# Patient Record
Sex: FEMALE | Race: WHITE | NOT HISPANIC OR LATINO | Employment: OTHER | URBAN - METROPOLITAN AREA
[De-identification: names, ages, dates, MRNs, and addresses within clinical notes are randomized per-mention and may not be internally consistent; named-entity substitution may affect disease eponyms.]

---

## 2017-03-14 ENCOUNTER — HOSPITAL ENCOUNTER (OUTPATIENT)
Dept: LAB | Facility: MEDICAL CENTER | Age: 82
End: 2017-03-14
Attending: FAMILY MEDICINE
Payer: MEDICARE

## 2017-03-14 LAB
25(OH)D3 SERPL-MCNC: 28 NG/ML (ref 30–100)
ALBUMIN SERPL BCP-MCNC: 3.9 G/DL (ref 3.2–4.9)
ALBUMIN/GLOB SERPL: 1.3 G/DL
ALP SERPL-CCNC: 199 U/L (ref 30–99)
ALT SERPL-CCNC: 29 U/L (ref 2–50)
ANION GAP SERPL CALC-SCNC: 10 MMOL/L (ref 0–11.9)
APPEARANCE UR: CLEAR
AST SERPL-CCNC: 24 U/L (ref 12–45)
BACTERIA #/AREA URNS HPF: ABNORMAL /HPF
BASOPHILS # BLD AUTO: 0.08 K/UL (ref 0–0.12)
BASOPHILS NFR BLD AUTO: 1.3 % (ref 0–1.8)
BILIRUB SERPL-MCNC: 1.2 MG/DL (ref 0.1–1.5)
BILIRUB UR QL STRIP.AUTO: NEGATIVE
BUN SERPL-MCNC: 16 MG/DL (ref 8–22)
CALCIUM SERPL-MCNC: 9.8 MG/DL (ref 8.5–10.5)
CHLORIDE SERPL-SCNC: 100 MMOL/L (ref 96–112)
CHOLEST SERPL-MCNC: 250 MG/DL (ref 100–199)
CO2 SERPL-SCNC: 35 MMOL/L (ref 20–33)
COLOR UR AUTO: YELLOW
CREAT SERPL-MCNC: 0.74 MG/DL (ref 0.5–1.4)
CREAT UR-MCNC: 175.9 MG/DL
CULTURE IF INDICATED INDCX: NO UA CULTURE
EOSINOPHIL # BLD: 0.05 K/UL (ref 0–0.51)
EOSINOPHIL NFR BLD AUTO: 0.8 % (ref 0–6.9)
EPITHELIAL CELLS 1715: ABNORMAL /HPF
ERYTHROCYTE [DISTWIDTH] IN BLOOD BY AUTOMATED COUNT: 48.1 FL (ref 35.9–50)
EST. AVERAGE GLUCOSE BLD GHB EST-MCNC: 117 MG/DL
FOLATE SERPL-MCNC: >23.6 NG/ML
GLOBULIN SER CALC-MCNC: 3.1 G/DL (ref 1.9–3.5)
GLUCOSE SERPL-MCNC: 123 MG/DL (ref 65–99)
GLUCOSE UR STRIP.AUTO-MCNC: NEGATIVE MG/DL
HBA1C MFR BLD: 5.7 % (ref 0–5.6)
HCT VFR BLD AUTO: 40.8 % (ref 37–47)
HDLC SERPL-MCNC: 46 MG/DL
HGB BLD-MCNC: 12.7 G/DL (ref 12–16)
IMM GRANULOCYTES # BLD AUTO: 0.06 K/UL (ref 0–0.11)
IMM GRANULOCYTES NFR BLD AUTO: 1 % (ref 0–0.9)
KETONES UR STRIP.AUTO-MCNC: NEGATIVE MG/DL
LDLC SERPL CALC-MCNC: 172 MG/DL
LEUKOCYTE ESTERASE UR QL STRIP.AUTO: NEGATIVE
LYMPHOCYTES # BLD: 1.22 K/UL (ref 1–4.8)
LYMPHOCYTES NFR BLD AUTO: 20.3 % (ref 22–41)
MCH RBC QN AUTO: 30.4 PG (ref 27–33)
MCHC RBC AUTO-ENTMCNC: 31.1 G/DL (ref 33.6–35)
MCV RBC AUTO: 97.6 FL (ref 81.4–97.8)
MICRO URNS: ABNORMAL
MICROALBUMIN UR-MCNC: 154 MG/DL
MICROALBUMIN/CREAT UR: 875 MG/G (ref 0–30)
MONOCYTES # BLD: 0.44 K/UL (ref 0–0.85)
MONOCYTES NFR BLD AUTO: 7.3 % (ref 0–13.4)
MUCOUS THREADS URNS QL MICRO: ABNORMAL /HPF
NEUTROPHILS # BLD: 4.16 K/UL (ref 2–7.15)
NEUTROPHILS NFR BLD AUTO: 69.3 % (ref 44–72)
NITRITE UR QL STRIP.AUTO: NEGATIVE
NRBC # BLD AUTO: 0 K/UL
NRBC BLD-RTO: 0 /100 WBC
PH UR: 8 [PH]
PLATELET # BLD AUTO: 342 K/UL (ref 164–446)
PMV BLD AUTO: 11.7 FL (ref 9–12.9)
POTASSIUM SERPL-SCNC: 4.7 MMOL/L (ref 3.6–5.5)
PROT SERPL-MCNC: 7 G/DL (ref 6–8.2)
PROT UR QL STRIP: 600 MG/DL
RBC # BLD AUTO: 4.18 M/UL (ref 4.2–5.4)
RBC #/AREA URNS HPF: ABNORMAL /HPF
RBC UR QL AUTO: NEGATIVE
SODIUM SERPL-SCNC: 145 MMOL/L (ref 135–145)
SP GR UR STRIP.AUTO: 1.02
TRIGL SERPL-MCNC: 159 MG/DL (ref 0–149)
TSH SERPL DL<=0.005 MIU/L-ACNC: 4.87 UIU/ML (ref 0.3–3.7)
VIT B12 SERPL-MCNC: 80 PG/ML (ref 211–911)
WBC # BLD AUTO: 6 K/UL (ref 4.8–10.8)
WBC #/AREA URNS HPF: ABNORMAL /HPF

## 2017-03-14 PROCEDURE — 36415 COLL VENOUS BLD VENIPUNCTURE: CPT

## 2017-03-14 PROCEDURE — 82607 VITAMIN B-12: CPT

## 2017-03-14 PROCEDURE — 82746 ASSAY OF FOLIC ACID SERUM: CPT

## 2017-03-14 PROCEDURE — 82306 VITAMIN D 25 HYDROXY: CPT

## 2017-03-14 PROCEDURE — 81001 URINALYSIS AUTO W/SCOPE: CPT

## 2017-03-14 PROCEDURE — 83036 HEMOGLOBIN GLYCOSYLATED A1C: CPT

## 2017-03-14 PROCEDURE — 82043 UR ALBUMIN QUANTITATIVE: CPT

## 2017-03-14 PROCEDURE — 82570 ASSAY OF URINE CREATININE: CPT

## 2017-03-14 PROCEDURE — 85025 COMPLETE CBC W/AUTO DIFF WBC: CPT

## 2017-03-14 PROCEDURE — 80053 COMPREHEN METABOLIC PANEL: CPT

## 2017-03-14 PROCEDURE — 80061 LIPID PANEL: CPT

## 2017-03-14 PROCEDURE — 84443 ASSAY THYROID STIM HORMONE: CPT

## 2017-03-19 ENCOUNTER — APPOINTMENT (OUTPATIENT)
Dept: RADIOLOGY | Facility: MEDICAL CENTER | Age: 82
End: 2017-03-19
Attending: EMERGENCY MEDICINE
Payer: MEDICARE

## 2017-03-19 ENCOUNTER — HOSPITAL ENCOUNTER (EMERGENCY)
Facility: MEDICAL CENTER | Age: 82
End: 2017-03-19
Attending: EMERGENCY MEDICINE | Admitting: INTERNAL MEDICINE
Payer: MEDICARE

## 2017-03-19 ENCOUNTER — RESOLUTE PROFESSIONAL BILLING HOSPITAL PROF FEE (OUTPATIENT)
Dept: HOSPITALIST | Facility: MEDICAL CENTER | Age: 82
End: 2017-03-19
Payer: MEDICARE

## 2017-03-19 ENCOUNTER — HOSPITAL ENCOUNTER (INPATIENT)
Facility: MEDICAL CENTER | Age: 82
LOS: 5 days | DRG: 872 | End: 2017-03-24
Attending: INTERNAL MEDICINE | Admitting: INTERNAL MEDICINE
Payer: MEDICARE

## 2017-03-19 VITALS
TEMPERATURE: 98 F | SYSTOLIC BLOOD PRESSURE: 136 MMHG | DIASTOLIC BLOOD PRESSURE: 54 MMHG | RESPIRATION RATE: 18 BRPM | HEART RATE: 87 BPM | BODY MASS INDEX: 15.47 KG/M2 | HEIGHT: 59 IN | WEIGHT: 76.72 LBS | OXYGEN SATURATION: 99 %

## 2017-03-19 DIAGNOSIS — K81.0 ACUTE CHOLECYSTITIS: ICD-10-CM

## 2017-03-19 DIAGNOSIS — R41.3 MEMORY LOSS: ICD-10-CM

## 2017-03-19 DIAGNOSIS — A41.9 SEPSIS, DUE TO UNSPECIFIED ORGANISM: ICD-10-CM

## 2017-03-19 PROBLEM — N17.9 ARF (ACUTE RENAL FAILURE) (HCC): Status: ACTIVE | Noted: 2017-03-19

## 2017-03-19 PROBLEM — J96.11 CHRONIC RESPIRATORY FAILURE WITH HYPOXIA (HCC): Status: ACTIVE | Noted: 2017-03-19

## 2017-03-19 PROBLEM — E53.8 B12 DEFICIENCY: Status: ACTIVE | Noted: 2017-03-19

## 2017-03-19 LAB
ALBUMIN SERPL BCP-MCNC: 3.4 G/DL (ref 3.2–4.9)
ALBUMIN/GLOB SERPL: 1.2 G/DL
ALP SERPL-CCNC: 131 U/L (ref 30–99)
ALT SERPL-CCNC: 18 U/L (ref 2–50)
ANION GAP SERPL CALC-SCNC: 13 MMOL/L (ref 0–11.9)
APPEARANCE UR: CLEAR
APTT PPP: 26.8 SEC (ref 24.7–36)
AST SERPL-CCNC: 23 U/L (ref 12–45)
BACTERIA #/AREA URNS HPF: ABNORMAL /HPF
BASOPHILS # BLD AUTO: 0.2 % (ref 0–1.8)
BASOPHILS # BLD: 0.04 K/UL (ref 0–0.12)
BILIRUB SERPL-MCNC: 1.5 MG/DL (ref 0.1–1.5)
BILIRUB UR QL CFM: NEGATIVE
BUN SERPL-MCNC: 29 MG/DL (ref 8–22)
CALCIUM SERPL-MCNC: 9.1 MG/DL (ref 8.4–10.2)
CASTS URNS QL MICRO: ABNORMAL /LPF
CHLORIDE SERPL-SCNC: 96 MMOL/L (ref 96–112)
CO2 SERPL-SCNC: 29 MMOL/L (ref 20–33)
COLOR UR: YELLOW
CREAT SERPL-MCNC: 1.2 MG/DL (ref 0.5–1.4)
CULTURE IF INDICATED INDCX: NO UA CULTURE
EOSINOPHIL # BLD AUTO: 0 K/UL (ref 0–0.51)
EOSINOPHIL NFR BLD: 0 % (ref 0–6.9)
EPI CELLS #/AREA URNS HPF: ABNORMAL /HPF
ERYTHROCYTE [DISTWIDTH] IN BLOOD BY AUTOMATED COUNT: 46.9 FL (ref 35.9–50)
GFR SERPL CREATININE-BSD FRML MDRD: 43 ML/MIN/1.73 M 2
GLOBULIN SER CALC-MCNC: 2.9 G/DL (ref 1.9–3.5)
GLUCOSE SERPL-MCNC: 146 MG/DL (ref 65–99)
GLUCOSE UR STRIP.AUTO-MCNC: NEGATIVE MG/DL
HCT VFR BLD AUTO: 42.2 % (ref 37–47)
HGB BLD-MCNC: 13.7 G/DL (ref 12–16)
IMM GRANULOCYTES # BLD AUTO: 0.14 K/UL (ref 0–0.11)
IMM GRANULOCYTES NFR BLD AUTO: 0.6 % (ref 0–0.9)
INR PPP: 1.46 (ref 0.87–1.13)
KETONES UR STRIP.AUTO-MCNC: ABNORMAL MG/DL
LACTATE BLD-SCNC: 2.49 MMOL/L (ref 0.5–2)
LACTATE BLD-SCNC: 3 MMOL/L (ref 0.5–2)
LACTATE BLD-SCNC: 3.38 MMOL/L (ref 0.5–2)
LACTATE BLD-SCNC: 3.56 MMOL/L (ref 0.5–2)
LEUKOCYTE ESTERASE UR QL STRIP.AUTO: NEGATIVE
LIPASE SERPL-CCNC: 15 U/L (ref 7–58)
LYMPHOCYTES # BLD AUTO: 0.62 K/UL (ref 1–4.8)
LYMPHOCYTES NFR BLD: 2.4 % (ref 22–41)
MCH RBC QN AUTO: 31.1 PG (ref 27–33)
MCHC RBC AUTO-ENTMCNC: 32.5 G/DL (ref 33.6–35)
MCV RBC AUTO: 95.7 FL (ref 81.4–97.8)
MICRO URNS: ABNORMAL
MONOCYTES # BLD AUTO: 2.09 K/UL (ref 0–0.85)
MONOCYTES NFR BLD AUTO: 8.2 % (ref 0–13.4)
MUCOUS THREADS #/AREA URNS HPF: ABNORMAL /HPF
NEUTROPHILS # BLD AUTO: 22.47 K/UL (ref 2–7.15)
NEUTROPHILS NFR BLD: 88.6 % (ref 44–72)
NITRITE UR QL STRIP.AUTO: NEGATIVE
NRBC # BLD AUTO: 0 K/UL
NRBC BLD AUTO-RTO: 0 /100 WBC
PH UR STRIP.AUTO: 5.5 [PH]
PLATELET # BLD AUTO: 306 K/UL (ref 164–446)
PMV BLD AUTO: 11.2 FL (ref 9–12.9)
POTASSIUM SERPL-SCNC: 4.3 MMOL/L (ref 3.6–5.5)
PROT SERPL-MCNC: 6.3 G/DL (ref 6–8.2)
PROT UR QL STRIP: 100 MG/DL
PROTHROMBIN TIME: 17.5 SEC (ref 12–14.6)
RBC # BLD AUTO: 4.41 M/UL (ref 4.2–5.4)
RBC # URNS HPF: ABNORMAL /HPF
RBC UR QL AUTO: NEGATIVE
SODIUM SERPL-SCNC: 138 MMOL/L (ref 135–145)
SP GR UR REFRACTOMETRY: 1.02
SPECIMEN DRAWN FROM PATIENT: ABNORMAL
TROPONIN I SERPL-MCNC: <0.02 NG/ML (ref 0–0.04)
UNIDENT CRYS URNS QL MICRO: ABNORMAL /HPF
WBC # BLD AUTO: 25.4 K/UL (ref 4.8–10.8)
WBC #/AREA URNS HPF: ABNORMAL /HPF

## 2017-03-19 PROCEDURE — 94760 N-INVAS EAR/PLS OXIMETRY 1: CPT

## 2017-03-19 PROCEDURE — 87040 BLOOD CULTURE FOR BACTERIA: CPT

## 2017-03-19 PROCEDURE — 99285 EMERGENCY DEPT VISIT HI MDM: CPT

## 2017-03-19 PROCEDURE — 82962 GLUCOSE BLOOD TEST: CPT

## 2017-03-19 PROCEDURE — 81001 URINALYSIS AUTO W/SCOPE: CPT

## 2017-03-19 PROCEDURE — 700105 HCHG RX REV CODE 258: Performed by: EMERGENCY MEDICINE

## 2017-03-19 PROCEDURE — 85610 PROTHROMBIN TIME: CPT

## 2017-03-19 PROCEDURE — 83036 HEMOGLOBIN GLYCOSYLATED A1C: CPT

## 2017-03-19 PROCEDURE — 96361 HYDRATE IV INFUSION ADD-ON: CPT

## 2017-03-19 PROCEDURE — 96367 TX/PROPH/DG ADDL SEQ IV INF: CPT

## 2017-03-19 PROCEDURE — 99223 1ST HOSP IP/OBS HIGH 75: CPT | Performed by: INTERNAL MEDICINE

## 2017-03-19 PROCEDURE — 74177 CT ABD & PELVIS W/CONTRAST: CPT

## 2017-03-19 PROCEDURE — 51701 INSERT BLADDER CATHETER: CPT

## 2017-03-19 PROCEDURE — 36415 COLL VENOUS BLD VENIPUNCTURE: CPT

## 2017-03-19 PROCEDURE — 85025 COMPLETE CBC W/AUTO DIFF WBC: CPT

## 2017-03-19 PROCEDURE — 70450 CT HEAD/BRAIN W/O DYE: CPT

## 2017-03-19 PROCEDURE — 700105 HCHG RX REV CODE 258: Performed by: INTERNAL MEDICINE

## 2017-03-19 PROCEDURE — 71010 DX-CHEST-PORTABLE (1 VIEW): CPT

## 2017-03-19 PROCEDURE — 80053 COMPREHEN METABOLIC PANEL: CPT

## 2017-03-19 PROCEDURE — 700117 HCHG RX CONTRAST REV CODE 255: Performed by: EMERGENCY MEDICINE

## 2017-03-19 PROCEDURE — 700111 HCHG RX REV CODE 636 W/ 250 OVERRIDE (IP): Performed by: EMERGENCY MEDICINE

## 2017-03-19 PROCEDURE — 93005 ELECTROCARDIOGRAM TRACING: CPT | Performed by: EMERGENCY MEDICINE

## 2017-03-19 PROCEDURE — 96365 THER/PROPH/DIAG IV INF INIT: CPT

## 2017-03-19 PROCEDURE — 770020 HCHG ROOM/CARE - TELE (206)

## 2017-03-19 PROCEDURE — 700111 HCHG RX REV CODE 636 W/ 250 OVERRIDE (IP)

## 2017-03-19 PROCEDURE — 700101 HCHG RX REV CODE 250: Performed by: INTERNAL MEDICINE

## 2017-03-19 PROCEDURE — 83605 ASSAY OF LACTIC ACID: CPT | Mod: 91

## 2017-03-19 PROCEDURE — 94640 AIRWAY INHALATION TREATMENT: CPT

## 2017-03-19 PROCEDURE — 84484 ASSAY OF TROPONIN QUANT: CPT

## 2017-03-19 PROCEDURE — 85730 THROMBOPLASTIN TIME PARTIAL: CPT

## 2017-03-19 PROCEDURE — 700111 HCHG RX REV CODE 636 W/ 250 OVERRIDE (IP): Performed by: INTERNAL MEDICINE

## 2017-03-19 PROCEDURE — 304561 HCHG STAT O2

## 2017-03-19 PROCEDURE — 700102 HCHG RX REV CODE 250 W/ 637 OVERRIDE(OP): Performed by: INTERNAL MEDICINE

## 2017-03-19 PROCEDURE — 83690 ASSAY OF LIPASE: CPT

## 2017-03-19 RX ORDER — SODIUM CHLORIDE 9 MG/ML
30 INJECTION, SOLUTION INTRAVENOUS
Status: DISCONTINUED | OUTPATIENT
Start: 2017-03-19 | End: 2017-03-19 | Stop reason: HOSPADM

## 2017-03-19 RX ORDER — SODIUM CHLORIDE 9 MG/ML
INJECTION, SOLUTION INTRAVENOUS CONTINUOUS
Status: CANCELLED | OUTPATIENT
Start: 2017-03-19

## 2017-03-19 RX ORDER — IPRATROPIUM BROMIDE AND ALBUTEROL SULFATE 2.5; .5 MG/3ML; MG/3ML
3 SOLUTION RESPIRATORY (INHALATION)
Status: DISCONTINUED | OUTPATIENT
Start: 2017-03-20 | End: 2017-03-19 | Stop reason: HOSPADM

## 2017-03-19 RX ORDER — DEXTROSE MONOHYDRATE 25 G/50ML
25 INJECTION, SOLUTION INTRAVENOUS
Status: DISCONTINUED | OUTPATIENT
Start: 2017-03-19 | End: 2017-03-19 | Stop reason: HOSPADM

## 2017-03-19 RX ORDER — LORAZEPAM 1 MG/1
0.5 TABLET ORAL EVERY 6 HOURS PRN
Status: DISCONTINUED | OUTPATIENT
Start: 2017-03-19 | End: 2017-03-23

## 2017-03-19 RX ORDER — ONDANSETRON 4 MG/1
4 TABLET, ORALLY DISINTEGRATING ORAL EVERY 4 HOURS PRN
Status: DISCONTINUED | OUTPATIENT
Start: 2017-03-19 | End: 2017-03-24 | Stop reason: HOSPADM

## 2017-03-19 RX ORDER — ONDANSETRON 2 MG/ML
4 INJECTION INTRAMUSCULAR; INTRAVENOUS EVERY 4 HOURS PRN
Status: CANCELLED | OUTPATIENT
Start: 2017-03-19

## 2017-03-19 RX ORDER — FAMOTIDINE 20 MG/1
20 TABLET, FILM COATED ORAL 2 TIMES DAILY
Status: DISCONTINUED | OUTPATIENT
Start: 2017-03-20 | End: 2017-03-22

## 2017-03-19 RX ORDER — SODIUM CHLORIDE 9 MG/ML
INJECTION, SOLUTION INTRAVENOUS CONTINUOUS
Status: DISCONTINUED | OUTPATIENT
Start: 2017-03-19 | End: 2017-03-23

## 2017-03-19 RX ORDER — LORAZEPAM 0.5 MG/1
0.5 TABLET ORAL EVERY 6 HOURS PRN
Status: CANCELLED | OUTPATIENT
Start: 2017-03-19

## 2017-03-19 RX ORDER — MORPHINE SULFATE 4 MG/ML
2 INJECTION, SOLUTION INTRAMUSCULAR; INTRAVENOUS
Status: DISCONTINUED | OUTPATIENT
Start: 2017-03-19 | End: 2017-03-23

## 2017-03-19 RX ORDER — IPRATROPIUM BROMIDE AND ALBUTEROL SULFATE 2.5; .5 MG/3ML; MG/3ML
3 SOLUTION RESPIRATORY (INHALATION)
Status: COMPLETED | OUTPATIENT
Start: 2017-03-19 | End: 2017-03-19

## 2017-03-19 RX ORDER — MORPHINE SULFATE 4 MG/ML
2 INJECTION, SOLUTION INTRAMUSCULAR; INTRAVENOUS
Status: CANCELLED | OUTPATIENT
Start: 2017-03-19

## 2017-03-19 RX ORDER — CYANOCOBALAMIN 1000 UG/ML
1000 INJECTION, SOLUTION INTRAMUSCULAR; SUBCUTANEOUS
Status: DISCONTINUED | OUTPATIENT
Start: 2017-03-19 | End: 2017-03-19 | Stop reason: HOSPADM

## 2017-03-19 RX ORDER — FAMOTIDINE 20 MG/1
20 TABLET, FILM COATED ORAL 2 TIMES DAILY
Status: DISCONTINUED | OUTPATIENT
Start: 2017-03-19 | End: 2017-03-19 | Stop reason: HOSPADM

## 2017-03-19 RX ORDER — ONDANSETRON 2 MG/ML
4 INJECTION INTRAMUSCULAR; INTRAVENOUS EVERY 4 HOURS PRN
Status: DISCONTINUED | OUTPATIENT
Start: 2017-03-19 | End: 2017-03-19 | Stop reason: HOSPADM

## 2017-03-19 RX ORDER — SODIUM CHLORIDE 9 MG/ML
INJECTION, SOLUTION INTRAVENOUS CONTINUOUS
Status: DISCONTINUED | OUTPATIENT
Start: 2017-03-19 | End: 2017-03-19 | Stop reason: HOSPADM

## 2017-03-19 RX ORDER — FAMOTIDINE 20 MG/1
20 TABLET, FILM COATED ORAL 2 TIMES DAILY
Status: CANCELLED | OUTPATIENT
Start: 2017-03-19

## 2017-03-19 RX ORDER — CYANOCOBALAMIN 1000 UG/ML
1000 INJECTION, SOLUTION INTRAMUSCULAR; SUBCUTANEOUS
Status: DISCONTINUED | OUTPATIENT
Start: 2017-04-18 | End: 2017-03-24 | Stop reason: HOSPADM

## 2017-03-19 RX ORDER — SODIUM CHLORIDE 9 MG/ML
500 INJECTION, SOLUTION INTRAVENOUS
Status: DISCONTINUED | OUTPATIENT
Start: 2017-03-19 | End: 2017-03-19 | Stop reason: HOSPADM

## 2017-03-19 RX ORDER — MORPHINE SULFATE 4 MG/ML
2 INJECTION, SOLUTION INTRAMUSCULAR; INTRAVENOUS
Status: DISCONTINUED | OUTPATIENT
Start: 2017-03-19 | End: 2017-03-19 | Stop reason: HOSPADM

## 2017-03-19 RX ORDER — SODIUM CHLORIDE 9 MG/ML
30 INJECTION, SOLUTION INTRAVENOUS ONCE
Status: DISCONTINUED | OUTPATIENT
Start: 2017-03-19 | End: 2017-03-19

## 2017-03-19 RX ORDER — SODIUM CHLORIDE 9 MG/ML
500 INJECTION, SOLUTION INTRAVENOUS
Status: CANCELLED | OUTPATIENT
Start: 2017-03-19

## 2017-03-19 RX ORDER — LORAZEPAM 2 MG/ML
0.5 INJECTION INTRAMUSCULAR EVERY 6 HOURS PRN
Status: DISCONTINUED | OUTPATIENT
Start: 2017-03-19 | End: 2017-03-19 | Stop reason: HOSPADM

## 2017-03-19 RX ORDER — ONDANSETRON 2 MG/ML
4 INJECTION INTRAMUSCULAR; INTRAVENOUS EVERY 4 HOURS PRN
Status: DISCONTINUED | OUTPATIENT
Start: 2017-03-19 | End: 2017-03-24 | Stop reason: HOSPADM

## 2017-03-19 RX ORDER — CYANOCOBALAMIN 1000 UG/ML
1000 INJECTION, SOLUTION INTRAMUSCULAR; SUBCUTANEOUS
Status: CANCELLED | OUTPATIENT
Start: 2017-04-18

## 2017-03-19 RX ORDER — LORAZEPAM 2 MG/ML
0.5 INJECTION INTRAMUSCULAR EVERY 6 HOURS PRN
Status: CANCELLED | OUTPATIENT
Start: 2017-03-19

## 2017-03-19 RX ORDER — SODIUM CHLORIDE 9 MG/ML
1000 INJECTION, SOLUTION INTRAVENOUS ONCE
Status: COMPLETED | OUTPATIENT
Start: 2017-03-19 | End: 2017-03-19

## 2017-03-19 RX ORDER — DEXTROSE MONOHYDRATE 25 G/50ML
25 INJECTION, SOLUTION INTRAVENOUS
Status: DISCONTINUED | OUTPATIENT
Start: 2017-03-19 | End: 2017-03-24 | Stop reason: HOSPADM

## 2017-03-19 RX ORDER — LORAZEPAM 0.5 MG/1
0.5 TABLET ORAL EVERY 6 HOURS PRN
Status: DISCONTINUED | OUTPATIENT
Start: 2017-03-19 | End: 2017-03-19 | Stop reason: HOSPADM

## 2017-03-19 RX ORDER — LORAZEPAM 2 MG/ML
0.5 INJECTION INTRAMUSCULAR EVERY 6 HOURS PRN
Status: DISCONTINUED | OUTPATIENT
Start: 2017-03-19 | End: 2017-03-23

## 2017-03-19 RX ORDER — SODIUM CHLORIDE 9 MG/ML
500 INJECTION, SOLUTION INTRAVENOUS
Status: COMPLETED | OUTPATIENT
Start: 2017-03-19 | End: 2017-03-19

## 2017-03-19 RX ORDER — DEXTROSE MONOHYDRATE 25 G/50ML
25 INJECTION, SOLUTION INTRAVENOUS
Status: CANCELLED | OUTPATIENT
Start: 2017-03-19

## 2017-03-19 RX ORDER — ONDANSETRON 4 MG/1
4 TABLET, ORALLY DISINTEGRATING ORAL EVERY 4 HOURS PRN
Status: DISCONTINUED | OUTPATIENT
Start: 2017-03-19 | End: 2017-03-19 | Stop reason: HOSPADM

## 2017-03-19 RX ORDER — IPRATROPIUM BROMIDE AND ALBUTEROL SULFATE 2.5; .5 MG/3ML; MG/3ML
3 SOLUTION RESPIRATORY (INHALATION)
Status: DISCONTINUED | OUTPATIENT
Start: 2017-03-19 | End: 2017-03-19 | Stop reason: HOSPADM

## 2017-03-19 RX ORDER — ONDANSETRON 4 MG/1
4 TABLET, ORALLY DISINTEGRATING ORAL EVERY 4 HOURS PRN
Status: CANCELLED | OUTPATIENT
Start: 2017-03-19

## 2017-03-19 RX ORDER — SODIUM CHLORIDE 9 MG/ML
30 INJECTION, SOLUTION INTRAVENOUS ONCE
Status: COMPLETED | OUTPATIENT
Start: 2017-03-19 | End: 2017-03-19

## 2017-03-19 RX ADMIN — SODIUM CHLORIDE 185 ML: 9 INJECTION, SOLUTION INTRAVENOUS at 16:36

## 2017-03-19 RX ADMIN — CEFEPIME HYDROCHLORIDE 2 G: 2 INJECTION, POWDER, FOR SOLUTION INTRAVENOUS at 16:15

## 2017-03-19 RX ADMIN — IPRATROPIUM BROMIDE AND ALBUTEROL SULFATE 3 ML: .5; 3 SOLUTION RESPIRATORY (INHALATION) at 18:57

## 2017-03-19 RX ADMIN — IOHEXOL 75 ML: 350 INJECTION, SOLUTION INTRAVENOUS at 16:00

## 2017-03-19 RX ADMIN — METRONIDAZOLE 500 MG: 500 INJECTION, SOLUTION INTRAVENOUS at 18:41

## 2017-03-19 RX ADMIN — SODIUM CHLORIDE: 9 INJECTION, SOLUTION INTRAVENOUS at 18:32

## 2017-03-19 RX ADMIN — SODIUM CHLORIDE: 9 INJECTION, SOLUTION INTRAVENOUS at 22:15

## 2017-03-19 RX ADMIN — CYANOCOBALAMIN 1000 MCG: 1000 INJECTION, SOLUTION INTRAMUSCULAR at 18:41

## 2017-03-19 RX ADMIN — SODIUM CHLORIDE 1000 ML: 9 INJECTION, SOLUTION INTRAVENOUS at 14:48

## 2017-03-19 RX ADMIN — PIPERACILLIN AND TAZOBACTAM 4.5 G: 4; .5 INJECTION, POWDER, LYOPHILIZED, FOR SOLUTION INTRAVENOUS; PARENTERAL at 16:35

## 2017-03-19 ASSESSMENT — COPD QUESTIONNAIRES
HAVE YOU SMOKED AT LEAST 100 CIGARETTES IN YOUR ENTIRE LIFE: NO/DON'T KNOW
DO YOU EVER COUGH UP ANY MUCUS OR PHLEGM?: NO/ONLY WITH OCCASIONAL COLDS OR INFECTIONS
DURING THE PAST 4 WEEKS HOW MUCH DID YOU FEEL SHORT OF BREATH: NONE/LITTLE OF THE TIME
COPD SCREENING SCORE: 2

## 2017-03-19 ASSESSMENT — LIFESTYLE VARIABLES
EVER_SMOKED: YES
EVER_SMOKED: NEVER
DO YOU DRINK ALCOHOL: NO
EVER_SMOKED: YES
ALCOHOL_USE: NO
EVER_SMOKED: NEVER

## 2017-03-19 ASSESSMENT — PAIN SCALES - GENERAL
PAINLEVEL_OUTOF10: 2
PAINLEVEL_OUTOF10: 0
PAINLEVEL_OUTOF10: 8

## 2017-03-19 NOTE — ED NOTES
BIB EMS from home (where pt lives alone) after son (who is POA) checked in on her and found her awake but slumped in a chair.  Son states increasing weakness recently/decreased motivation to get up and move.  Son states not eating at home.  Son states often doesn't wear her home O2 (hx COPD).   Son states intermittent confusion/unconfirmed dementia x years.  Pt currently oriented to name and city.   strong equal, face symmetric, speech clear.

## 2017-03-19 NOTE — ED NOTES
Blood drawn and to lab.  Mini-cathed for urine (tolerated well) and urine to lab.  NS infusing.  Family ay BS and call light in reach.  Awaits imaging.

## 2017-03-19 NOTE — IP AVS SNAPSHOT
" <p align=\"LEFT\"><IMG SRC=\"//[email or web address]$/[email or web address]\" alt=\"Image\" WIDTH=\"50%\" HEIGHT=\"200\" BORDER=\"\"></p>                   Name:Nicole Vora  Medical Record Number:[id number]  CSN: [id number]    YOB: 1934   Age: 82 y.o.  Sex: female  HT:1.499 m (4' 11\") WT: 48.3 kg (106 lb 7.7 oz)          Admit Date: 3/19/2017     Discharge Date:   Today's Date: 3/24/2017  Attending Doctor:  Obed Schaffer M.D.                  Allergies:  Review of patient's allergies indicates no known allergies.          Follow-up Information     1. Schedule an appointment as soon as possible for a visit with Reena Luong M.D..    Specialty:  Family Medicine    Why:  Follow-up appointment    Contact information    [address]  [place] [address]  [phone]          2. Follow up with Lucille Polanco M.D. In 2 weeks.    Specialties:  Surgery, Radiology    Contact information    [address]  [address]  [place] [id number] [phone]          3. Follow up with Reena Luong M.D. In 1 week.    Specialty:  Family Medicine    Contact information    [address] [facility] [address]  [address]  [place] [address]  [phone]           Medication List      Take these Medications        Instructions    ciprofloxacin 500 MG Tabs   Commonly known as:  CIPRO    Take 1 Tab by mouth every 12 hours for 7 days.   Dose:  500 mg       cyanocobalamin 1000 MCG Tabs   Commonly known as:  VITAMIN B12    Take 1 Tab by mouth every day.   Dose:  1000 mcg       furosemide 20 MG Tabs   Commonly known as:  LASIX    Take 1 Tab by mouth 2 times a day for 7 days.   Dose:  20 mg       metronidazole 500 MG Tabs   Commonly known as:  FLAGYL    Take 1 Tab by mouth every 8 hours for 7 days.   Dose:  500 mg       ondansetron 4 MG Tbdp   Commonly known as:  ZOFRAN ODT    Take 1 Tab by mouth every four hours as needed for Nausea/Vomiting (give PO if IV route is unavailable. May give per feeding tube.).   Dose:  4 mg       potassium " chloride SA 10 MEQ Tbcr   Commonly known as:  K-DUR    Take 2 Tabs by mouth every day.   Dose:  20 mEq

## 2017-03-19 NOTE — ED NOTES
2nd set bld cx drawn and additional iv placed . Pt to ct at this time. Update given to pt and family

## 2017-03-19 NOTE — ED PROVIDER NOTES
ED Provider Note    CHIEF COMPLAINT  Chief Complaint   Patient presents with   • Weakness       HPI  Nicole Vora is a 82 y.o. female who presents to the ER complaining of weakness.  The patient will be confused.  The pars Juan or cannot obtain much history from her.  She denies any complaints of pain or injury or other complaints.    The family states that she was last seen 2 days ago, was acting normally spoke with her the phone yesterday and she seemed fine today.  She was found slumped over in the chair.  She is weak, unable to stand or get up.  She is brought in here.  She denies any other acute concerns or complaints.  Family states she has not been eating well over the last 2 days.  No other history is really obtainable because of her mental status.    REVIEW OF SYSTEMS  See HPI for further details.  Unable to obtain completely review of systems secondary to mental status.    PAST MEDICAL HISTORY  Past Medical History   Diagnosis Date   • COPD    • Breast cancer    • Hyperlipidemia    • Depression        FAMILY HISTORY  Family History   Problem Relation Age of Onset   • Heart Disease Neg Hx        SOCIAL HISTORY  Social History     Social History   • Marital Status: Single     Spouse Name: N/A   • Number of Children: N/A   • Years of Education: N/A     Social History Main Topics   • Smoking status: Former Smoker     Types: Cigarettes     Quit date: 01/01/1984   • Smokeless tobacco: Never Used   • Alcohol Use: Not on file   • Drug Use: Not on file   • Sexual Activity: Not on file     Other Topics Concern   • Not on file     Social History Narrative   • No narrative on file       SURGICAL HISTORY  Past Surgical History   Procedure Laterality Date   • Lumpectomy         CURRENT MEDICATIONS  Home Medications     **Home medications have not yet been reviewed for this encounter**      .  She is on no medications    ALLERGIES  No Known Allergies    PHYSICAL EXAM  VITAL SIGNS: /59 mmHg  Pulse 90   "Temp([facility]) 37.3 °C (99.1 °F)  Resp 18  Ht 1.499 m (4' 11.02\")  Wt 39.463 kg (87 lb)  BMI 17.56 kg/m2  SpO2 97%     Constitutional: Awake, alert, ill-appearing but in no acute cardiopulmonary distress  HENT: Normocephalic, Atraumatic, Bilateral external ears normal, Oropharynx dry, No oral exudates, Nose normal.   Eyes: PERRL, EOMI, Conjunctiva normal, No discharge.   Neck: Normal range of motion, No tenderness, Supple, No stridor.   Lymphatic: No lymphadenopathy noted.   Cardiovascular: Normal heart rate, Normal rhythm, No murmurs, No rubs, No gallops.   Thorax & Lungs: Distant breath sounds.  No crackles  Abdomen: Bowel sounds normal, Soft, tender in the right.  Umbilical and right upper quadrant with a palpable mass.  No peritonitis  Skin: Warm, Dry, No erythema, No rash.   Back: No tenderness, No CVA tenderness.   Musculoskeletal: Good range of motion in all major joints.   Neurologic: Alert & oriented x 3, No focal deficits noted.   Psychiatric: Affect normal,      RADIOLOGY/PROCEDURES  CT-HEAD W/O   Final Result      1.  Cerebral atrophy.      2.  White matter lucencies most consistent with small vessel ischemic change versus demyelination or gliosis.      3.  Otherwise, Head CT without contrast with no acute findings. No evidence of acute cerebral infarction, hemorrhage or mass lesion.      DX-CHEST-PORTABLE (1 VIEW)   Final Result      Enlarged cardiac silhouette without evidence of acute disease.      CT-ABDOMEN-PELVIS WITH   Final Result      1.  Markedly distended gallbladder with gallbladder wall thickening and pericholecystic fluid/inflammation with apparent gallstone in the gallbladder neck consistent with acute cholecystitis with intra and extra hepatic biliary duct dilatation which    appears due to choledocholithiasis.      2.  Diverticulosis without evidence of diverticulitis.      3.  Aortic atherosclerosis.      4.  Bilateral renal cysts.          EKG Interpretation    Interpreted by " me    Rhythm: normal sinus   Rate: normal  Axis: normal  Ectopy: none  Conduction: normal  ST Segments: no acute change  T Waves: no acute change  Q Waves: none    Clinical Impression: no acute changes and normal EKG    COURSE & MEDICAL DECISION MAKING  Pertinent Labs & Imaging studies reviewed. (See chart for details)  Patient presents with confusion, weakness, and overall concerns for dehydration and failure to thrive.  Broad differential diagnosis was considered including but not limited to UTI, pneumonia, sepsis, dehydration, diverticulitis, cholecystitis.  Workup was initiated for this.    Labs and fluids are ordered.    CT was ordered of the abdomen because of a palpable mass.  The right upper quadrant.  Head CT was ordered because of confusion and weakness.    Head CT and chest x-ray were negative.  Labs came back quite abnormal, so she started her current antibiotics.  Cultures and lactic acid are obtained.  She is given a fluid bolus for sepsis.  She is given antibiotics.    CT came back with distended gallbladder.  I switched her to Zosyn.  I spoke with the surgeon.  The hospitalist has seen and admitted the patient.  Patient is admitted for cholecystitis, biliary obstruction.*    FINAL IMPRESSION  1. Acute cholecystitis    2. Sepsis, due to unspecified organism (CMS-[facility])             Electronically signed by: Raphael Montilla, 3/19/2017 3:00 PM

## 2017-03-19 NOTE — IP AVS SNAPSHOT
" Home Care Instructions                                                                                                                  Name:Nicole Vora  Medical Record Number:[id number]  CSN: [id number]    YOB: 1934   Age: 82 y.o.  Sex: female  HT:1.499 m (4' 11\") WT: 48.3 kg (106 lb 7.7 oz)          Admit Date: 3/19/2017     Discharge Date:   Today's Date: 3/24/2017  Attending Doctor:  Obed Schaffer M.D.                  Allergies:  Review of patient's allergies indicates no known allergies.            Discharge Instructions       Discharge Instructions    Discharged to other by medical transportation with escort. Discharged via wheelchair, hospital escort: Yes.  Special equipment needed: Oxygen    Be sure to schedule a follow-up appointment with your primary care doctor or any specialists as instructed.     Discharge Plan:   Diet Plan: Discussed  Activity Level: Discussed  Confirmed Follow up Appointment: Patient to Call and Schedule Appointment  Confirmed Symptoms Management: Discussed  Medication Reconciliation Updated: Yes  Influenza Vaccine Indication: Patient Refuses    I understand that a diet low in cholesterol, fat, and sodium is recommended for good health. Unless I have been given specific instructions below for another diet, I accept this instruction as my diet prescription.   Other diet: heart healthy, low sodium     Special Instructions: None    · Is patient discharged on Warfarin / Coumadin?   No     · Is patient Post Blood Transfusion?  No    Depression / Suicide Risk    As you are discharged from this [facility] facility, it is important to learn how to keep safe from harming yourself.    Recognize the warning signs:  · Abrupt changes in personality, positive or negative- including increase in energy   · Giving away possessions  · Change in eating patterns- significant weight changes-  positive or negative  · Change in sleeping patterns- unable to sleep or sleeping all the " time   · Unwillingness or inability to communicate  · Depression  · Unusual sadness, discouragement and loneliness  · Talk of wanting to die  · Neglect of personal appearance   · Rebelliousness- reckless behavior  · Withdrawal from people/activities they love  · Confusion- inability to concentrate     If you or a loved one observes any of these behaviors or has concerns about self-harm, here's what you can do:  · Talk about it- your feelings and reasons for harming yourself  · Remove any means that you might use to hurt yourself (examples: pills, rope, extension cords, firearm)  · Get professional help from the community (Mental Health, Substance Abuse, psychological counseling)  · Do not be alone:Call your Safe Contact- someone whom you trust who will be there for you.  · Call your local CRISIS HOTLINE 016-5916 or 150-778-0028  · Call your local Children's Mobile Crisis Response Team [place] (197) 819-6458 or www.clickTRUE  · Call the toll free National Suicide Prevention Hotlines   · National Suicide Prevention Lifeline 172-756-LIDK (2467)  · [employer] Network 800-SUICIDE (899-7492)    Cholecystitis  Cholecystitis is an inflammation of your gallbladder. It is usually caused by a buildup of gallstones or sludge (cholelithiasis) in your gallbladder. Your gallbladder stores a fluid that helps digest fats (bile). Cholecystitis is serious and needs treatment right away.  CAUSES   · Gallstones. Gallstones can block the tube that leads to your gallbladder, causing bile to build up. As bile builds up, your gallbladder becomes inflamed.  · Bile duct problems, such as blockage from scarring or kinking.  · Tumors. Tumors can stop bile from leaving your gallbladder correctly, causing bile to build up. As bile builds up, your gallbladder becomes inflamed.  SYMPTOMS  · Nausea.  · Vomiting.  · Abdominal pain, especially in the upper right area of your abdomen.  · Abdominal tenderness or  bloating.  · Sweating.  · Chills.  · Fever.  · Yellowing of your skin and the whites of your eyes (jaundice).  DIAGNOSIS  Your health care provider may order blood tests to look for infection or gallbladder problems. Your health care provider may also order imaging tests, such as an ultrasound or MRI. If necessary, more imaging tests may be ordered. Further tests may include a hepatobiliary iminodiacetic acid (HIDA) scan. This scan allows your health care provider to see the bile move from your liver to your gallbladder and to your small intestine. Your health care provider may perform liver function tests if gallstones are suspected.  TREATMENT  A hospital stay is usually necessary to lessen the inflammation of your gallbladder. You may be required to not eat or drink (fast) for a certain amount of time. You may be given medicine to treat pain or an antibiotic medicine to treat an infection. Surgery may be needed to remove your gallbladder (cholecystectomy) once the inflammation has gone down. Surgery may be needed right away if you develop complications, such as a tear (perforation) of the gallbladder or death of gallbladder tissue (gangrene).  HOME CARE INSTRUCTIONS  Home care will depend on your treatment. In general:  · If you were given antibiotics, take them as directed. Finish them even if you start to feel better.  · Only take over-the-counter or prescription medicines for pain, discomfort, or fever as directed by your health care provider.  · Follow a low-fat diet until you see your health care provider again.  · Keep all follow-up visits as directed by your health care provider.  · Avoid eating or drinking anything that triggers your symptoms.  SEEK IMMEDIATE MEDICAL CARE IF:  · Your pain is increasing and is not controlled by medicines.  · Your pain moves to another part of your abdomen or to your back.  · You have a fever.  · You have nausea and vomiting.  · You continue to have symptoms or develop new  symptoms even with treatment.  MAKE SURE YOU:  · Understand these instructions.  · Will watch your condition.  · Will get help right away if you are not doing well or get worse.     This information is not intended to replace advice given to you by your health care provider. Make sure you discuss any questions you have with your health care provider.     Document Released: 12/18/2006 Document Revised: 01/08/2016 Document Reviewed: 03/30/2016  [employer] Interactive Patient Education ©2016 [employer]       Cholecystostomy  The gallbladder is a pear-shaped organ that lies beneath the liver on the right side of the body. The gallbladder stores bile, a fluid that helps the body digest fats. However, sometimes bile and other fluids build up in the gallbladder because of an obstruction (for example, a gallstones). This can cause fever, pain, swelling, nausea and other serious symptoms.  The procedure used to drain these fluids is called a cholecystostomy. A tube is inserted into the gallbladder. Fluid drains through the tube into a plastic bag outside the body. This procedure is usually done on people who are admitted to the hospital.  The procedure is often recommended for people who cannot have gallbladder surgery right away, usually because they are too ill to make it through surgery. The cholecystostomy tube is usually temporary, until surgery can be done.  RISKS AND COMPLICATIONS  Although rare, complications can include:  · Clogging of the tube.  · Infection in or around the drain site. Antibiotics might be prescribed for the infection. Or, another tube might be inserted to drain the infected fluid.  · Internal bleeding from the liver.  BEFORE THE PROCEDURE   · Try to quit smoking several weeks before the procedure. Smoking can slow healing.  · Arrange for someone to drive you home from the hospital.  · Right before your procedure, avoid all foods and liquids after midnight. This includes coffee, tea and  water.  · On the day of the procedure, arrive early to fill out all the paperwork.  PROCEDURE  You will be given a sedative to make you sleepy and a local anesthetic to numb the skin. Next, a small cut is made in the abdomen. Then a tube is threaded through the cut into the gallbladder. The procedure is usually done with ultrasound to guide the tube into the gallbladder. Once the tube is in place, the drain is secured to the skin with a stitch. The tube is then connected to a drainage bag.   AFTER THE PROCEDURE   · People who have a cholecystostomy usually stay in the hospital for several days because they are so ill. You might not be able to eat for the first few days. Instead, you will be connected to an IV for fluids and nutrients.  · The procedure does not cure the blockage that caused the fluid to build up in the first place. Because of this, the gallbladder will need to be removed in the future. The drain is removed at that time.  HOME CARE INSTRUCTIONS   Be sure to follow your healthcare provider's instructions carefully. You may shower but avoid tub baths and swimming until your caregiver says it is OK. Eat and drink according to the directions you have been given. And be sure to make all follow-up appointments.   Call your healthcare provider if you notice new pain, redness or swelling around the wound.  SEEK IMMEDIATE MEDICAL CARE IF:   · There is increased abdominal pain.  · Nausea or vomiting occurs.  · You develop a fever.  · The drainage tube comes out of the abdomen.     This information is not intended to replace advice given to you by your health care provider. Make sure you discuss any questions you have with your health care provider.     Document Released: 03/16/2010 Document Revised: 01/08/2016 Document Reviewed: 03/30/2016  [employer] Interactive Patient Education ©2016 [employer]    Cholelithiasis  Cholelithiasis (also called gallstones) is a form of gallbladder disease. The gallbladder is a  small organ that helps you digest fats. Symptoms of gallstones are:  · Feeling sick to your stomach (nausea).  · Throwing up (vomiting).  · Belly pain.  · Yellowing of the skin (jaundice).  · Sudden pain. You may feel the pain for minutes to hours.  · Fever.  · Pain to the touch.  HOME CARE  · Only take medicines as told by your doctor.  · Eat a low-fat diet until you see your doctor again. Eating fat can result in pain.  · Follow up with your doctor as told. Attacks usually happen time after time. Surgery is usually needed for permanent treatment.  GET HELP RIGHT AWAY IF:   · Your pain gets worse.  · Your pain is not helped by medicines.  · You have a fever and lasting symptoms for more than 2-3 days.  · You have a fever and your symptoms suddenly get worse.  · You keep feeling sick to your stomach and throwing up.  MAKE SURE YOU:   · Understand these instructions.  · Will watch your condition.  · Will get help right away if you are not doing well or get worse.     This information is not intended to replace advice given to you by your health care provider. Make sure you discuss any questions you have with your health care provider.     Document Released: 06/05/2009 Document Revised: 08/20/2014 Document Reviewed: 06/11/2014  [employer] Interactive Patient Education ©2016 [employer]          Follow-up Information     1. Schedule an appointment as soon as possible for a visit with Reena Luong M.D..    Specialty:  Family Medicine    Why:  Follow-up appointment    Contact information    [address]  [place] [address]  [phone]          2. Follow up with Lucille Polanco M.D. In 2 weeks.    Specialties:  Surgery, Radiology    Contact information    [address]  [address]  [place] [id number] [phone]          3. Follow up with Reena Luong M.D. In 1 week.    Specialty:  Family Medicine    Contact information    [address]  [place]  [address]  [phone]           Discharge Medication Instructions:    Below are the medications your physician expects you to take upon discharge:    Review all your home medications and newly ordered medications with your doctor and/or pharmacist. Follow medication instructions as directed by your doctor and/or pharmacist.    Please keep your medication list with you and share with your physician.               Medication List      START taking these medications        Instructions    ciprofloxacin 500 MG Tabs   Last time this was given:  500 mg on 3/24/2017  8:21 AM   Commonly known as:  CIPRO   Next Dose Due:  Tomorrow morning      Take 1 Tab by mouth every 12 hours for 7 days.   Dose:  500 mg       cyanocobalamin 1000 MCG Tabs   Last time this was given:  1,000 mcg on 3/24/2017  8:21 AM   Commonly known as:  VITAMIN B12   Next Dose Due:  Tomorrow morining    Take 1 Tab by mouth every day.   Dose:  1000 mcg       furosemide 20 MG Tabs   Commonly known as:  LASIX   Next Dose Due:  Tonight    Take 1 Tab by mouth 2 times a day for 7 days.   Dose:  20 mg       metronidazole 500 MG Tabs   Last time this was given:  500 mg on 3/24/2017  6:03 AM   Commonly known as:  FLAGYL   Next Dose Due:  Tomorrow afternoon     Take 1 Tab by mouth every 8 hours for 7 days.   Dose:  500 mg       ondansetron 4 MG Tbdp   Commonly known as:  ZOFRAN ODT   Next Dose Due:  As needed for nausea    Take 1 Tab by mouth every four hours as needed for Nausea/Vomiting (give PO if IV route is unavailable. May give per feeding tube.).   Dose:  4 mg       potassium chloride SA 10 MEQ Tbcr   Last time this was given:  40 mEq on 3/22/2017  3:46 PM   Commonly known as:  K-DUR   Next Dose Due:  This afternoon     Take 2 Tabs by mouth every day.   Dose:  20 mEq               Instructions           Diet / Nutrition:    Follow any diet instructions given to you by your doctor or the dietician, including how much salt (sodium) you are allowed each day.    If  you are overweight, talk to your doctor about a weight reduction plan.    Activity:    Remain physically active following your doctor's instructions about exercise and activity.    Rest often.     Any time you become even a little tired or short of breath, SIT DOWN and rest.    Worsening Symptoms:    Report any of the following signs and symptoms to the doctor's office immediately:    *Pain of jaw, arm, or neck  *Chest pain not relieved by medication                               *Dizziness or loss of consciousness  *Difficulty breathing even when at rest   *More tired than usual                                       *Bleeding drainage or swelling of surgical site  *Swelling of feet, ankles, legs or stomach                 *Fever (>100ºF)  *Pink or blood tinged sputum  *Weight gain (3lbs/day or 5lbs /week)           *Shock from internal defibrillator (if applicable)  *Palpitations or irregular heartbeats                *Cool and/or numb extremities    Stroke Awareness    Common Risk Factors for Stroke include:    Age  Atrial Fibrillation  Carotid Artery Stenosis  Diabetes Mellitus  Excessive alcohol consumption  High blood pressure  Overweight   Physical inactivity  Smoking    Warning signs and symptoms of a stroke include:    *Sudden numbness or weakness of the face, arm or leg (especially on one side of the body).  *Sudden confusion, trouble speaking or understanding.  *Sudden trouble seeing in one or both eyes.  *Sudden trouble walking, dizziness, loss of balance or coordination.Sudden severe headache with no known cause.    It is very important to get treatment quickly when a stroke occurs. If you experience any of the above warning signs, call 911 immediately.                   Disclaimer         Quit Smoking / Tobacco Use:    I understand the use of any tobacco products increases my chance of suffering from future heart disease or stroke and could cause other illnesses which may shorten my life. Quitting the  use of tobacco products is the single most important thing I can do to improve my health. For further information on smoking / tobacco cessation call a Toll Free Quit Line at 1-121.770.4565 (*National [employer] [place]) or 1-290.993.8890 (American Lung Association) or you can access the web based program at www.lungusa.org.    [place] Tobacco Users Help Line:  (403) 743-2329       Toll Free: 1-108.615.1299  Quit Tobacco Program [facility] (480)301-8786    Crisis Hotline:    [place] Crisis Hotline:  2-959-UAQRSPQ or 1-227.770.6140    [place] Crisis Hotline:    1-797.648.8399 or 314-946-3738    Discharge Survey:   Thank you for choosing [facility]. We hope we did everything we could to make your hospital stay a pleasant one. You may be receiving a phone survey and we would appreciate your time and participation in answering the questions. Your input is very valuable to us in our efforts to improve our service to our patients and their families.        My signature on this form indicates that:    1. I have reviewed and understand the above information.  2. My questions regarding this information have been answered to my satisfaction.  3. I have formulated a plan with my discharge nurse to obtain my prescribed medications for home.                  Disclaimer         __________________________________                     __________       ________                       Patient Signature                                                 Date                    Time

## 2017-03-20 ENCOUNTER — APPOINTMENT (OUTPATIENT)
Dept: RADIOLOGY | Facility: MEDICAL CENTER | Age: 82
DRG: 872 | End: 2017-03-20
Attending: INTERNAL MEDICINE
Payer: MEDICARE

## 2017-03-20 LAB
ALBUMIN SERPL BCP-MCNC: 2.9 G/DL (ref 3.2–4.9)
ALBUMIN/GLOB SERPL: 1.2 G/DL
ALP SERPL-CCNC: 99 U/L (ref 30–99)
ALT SERPL-CCNC: 12 U/L (ref 2–50)
ANION GAP SERPL CALC-SCNC: 8 MMOL/L (ref 0–11.9)
AST SERPL-CCNC: 13 U/L (ref 12–45)
BASOPHILS # BLD AUTO: 0 % (ref 0–1.8)
BASOPHILS # BLD: 0 K/UL (ref 0–0.12)
BILIRUB SERPL-MCNC: 1 MG/DL (ref 0.1–1.5)
BUN SERPL-MCNC: 29 MG/DL (ref 8–22)
CALCIUM SERPL-MCNC: 8.3 MG/DL (ref 8.5–10.5)
CHLORIDE SERPL-SCNC: 107 MMOL/L (ref 96–112)
CO2 SERPL-SCNC: 25 MMOL/L (ref 20–33)
CREAT SERPL-MCNC: 0.86 MG/DL (ref 0.5–1.4)
EKG IMPRESSION: NORMAL
EOSINOPHIL # BLD AUTO: 0 K/UL (ref 0–0.51)
EOSINOPHIL NFR BLD: 0 % (ref 0–6.9)
ERYTHROCYTE [DISTWIDTH] IN BLOOD BY AUTOMATED COUNT: 46.5 FL (ref 35.9–50)
EST. AVERAGE GLUCOSE BLD GHB EST-MCNC: 114 MG/DL
GFR SERPL CREATININE-BSD FRML MDRD: >60 ML/MIN/1.73 M 2
GLOBULIN SER CALC-MCNC: 2.4 G/DL (ref 1.9–3.5)
GLUCOSE BLD-MCNC: 105 MG/DL (ref 65–99)
GLUCOSE BLD-MCNC: 105 MG/DL (ref 65–99)
GLUCOSE BLD-MCNC: 111 MG/DL (ref 65–99)
GLUCOSE BLD-MCNC: 77 MG/DL (ref 65–99)
GLUCOSE BLD-MCNC: 96 MG/DL (ref 65–99)
GLUCOSE SERPL-MCNC: 115 MG/DL (ref 65–99)
GRAM STN SPEC: NORMAL
HBA1C MFR BLD: 5.6 % (ref 0–5.6)
HCT VFR BLD AUTO: 34.8 % (ref 37–47)
HGB BLD-MCNC: 11.4 G/DL (ref 12–16)
LACTATE BLD-SCNC: 0.8 MMOL/L (ref 0.5–2)
LACTATE BLD-SCNC: 1.5 MMOL/L (ref 0.5–2)
LYMPHOCYTES # BLD AUTO: 1.87 K/UL (ref 1–4.8)
LYMPHOCYTES NFR BLD: 9.6 % (ref 22–41)
MANUAL DIFF BLD: NORMAL
MCH RBC QN AUTO: 30.8 PG (ref 27–33)
MCHC RBC AUTO-ENTMCNC: 32.8 G/DL (ref 33.6–35)
MCV RBC AUTO: 94.1 FL (ref 81.4–97.8)
MONOCYTES # BLD AUTO: 0.35 K/UL (ref 0–0.85)
MONOCYTES NFR BLD AUTO: 1.8 % (ref 0–13.4)
MORPHOLOGY BLD-IMP: NORMAL
NEUTROPHILS # BLD AUTO: 17.28 K/UL (ref 2–7.15)
NEUTROPHILS NFR BLD: 87.7 % (ref 44–72)
NEUTS BAND NFR BLD MANUAL: 0.9 % (ref 0–10)
NRBC # BLD AUTO: 0 K/UL
NRBC BLD AUTO-RTO: 0 /100 WBC
PHOSPHATE SERPL-MCNC: 3 MG/DL (ref 2.5–4.5)
PLATELET # BLD AUTO: 227 K/UL (ref 164–446)
PLATELET BLD QL SMEAR: NORMAL
PMV BLD AUTO: 11.3 FL (ref 9–12.9)
POTASSIUM SERPL-SCNC: 3.6 MMOL/L (ref 3.6–5.5)
PROT SERPL-MCNC: 5.3 G/DL (ref 6–8.2)
RBC # BLD AUTO: 3.7 M/UL (ref 4.2–5.4)
RBC BLD AUTO: NORMAL
SIGNIFICANT IND 70042: NORMAL
SITE SITE: NORMAL
SODIUM SERPL-SCNC: 140 MMOL/L (ref 135–145)
SOURCE SOURCE: NORMAL
WBC # BLD AUTO: 19.5 K/UL (ref 4.8–10.8)

## 2017-03-20 PROCEDURE — 700111 HCHG RX REV CODE 636 W/ 250 OVERRIDE (IP): Performed by: INTERNAL MEDICINE

## 2017-03-20 PROCEDURE — 85027 COMPLETE CBC AUTOMATED: CPT

## 2017-03-20 PROCEDURE — 82962 GLUCOSE BLOOD TEST: CPT

## 2017-03-20 PROCEDURE — 700105 HCHG RX REV CODE 258

## 2017-03-20 PROCEDURE — 84100 ASSAY OF PHOSPHORUS: CPT

## 2017-03-20 PROCEDURE — 80053 COMPREHEN METABOLIC PANEL: CPT

## 2017-03-20 PROCEDURE — 700105 HCHG RX REV CODE 258: Performed by: INTERNAL MEDICINE

## 2017-03-20 PROCEDURE — G9166 ATTEN GOAL STATUS: HCPCS | Mod: CJ

## 2017-03-20 PROCEDURE — 4410213 CT-DRAIN-RETROPERITONEAL

## 2017-03-20 PROCEDURE — 700111 HCHG RX REV CODE 636 W/ 250 OVERRIDE (IP)

## 2017-03-20 PROCEDURE — 83605 ASSAY OF LACTIC ACID: CPT

## 2017-03-20 PROCEDURE — 36415 COLL VENOUS BLD VENIPUNCTURE: CPT

## 2017-03-20 PROCEDURE — 99233 SBSQ HOSP IP/OBS HIGH 50: CPT | Performed by: INTERNAL MEDICINE

## 2017-03-20 PROCEDURE — 700101 HCHG RX REV CODE 250: Performed by: INTERNAL MEDICINE

## 2017-03-20 PROCEDURE — 770020 HCHG ROOM/CARE - TELE (206)

## 2017-03-20 PROCEDURE — 87070 CULTURE OTHR SPECIMN AEROBIC: CPT

## 2017-03-20 PROCEDURE — 700101 HCHG RX REV CODE 250

## 2017-03-20 PROCEDURE — 92523 SPEECH SOUND LANG COMPREHEN: CPT

## 2017-03-20 PROCEDURE — A9270 NON-COVERED ITEM OR SERVICE: HCPCS | Performed by: INTERNAL MEDICINE

## 2017-03-20 PROCEDURE — 0F9430Z DRAINAGE OF GALLBLADDER WITH DRAINAGE DEVICE, PERCUTANEOUS APPROACH: ICD-10-PCS | Performed by: RADIOLOGY

## 2017-03-20 PROCEDURE — 85007 BL SMEAR W/DIFF WBC COUNT: CPT

## 2017-03-20 PROCEDURE — G9165 ATTEN CURRENT STATUS: HCPCS | Mod: CJ

## 2017-03-20 PROCEDURE — 87205 SMEAR GRAM STAIN: CPT

## 2017-03-20 PROCEDURE — 700102 HCHG RX REV CODE 250 W/ 637 OVERRIDE(OP): Performed by: INTERNAL MEDICINE

## 2017-03-20 RX ORDER — MIDAZOLAM HYDROCHLORIDE 1 MG/ML
INJECTION INTRAMUSCULAR; INTRAVENOUS
Status: COMPLETED
Start: 2017-03-20 | End: 2017-03-20

## 2017-03-20 RX ORDER — MIDAZOLAM HYDROCHLORIDE 1 MG/ML
.5-2 INJECTION INTRAMUSCULAR; INTRAVENOUS PRN
Status: ACTIVE | OUTPATIENT
Start: 2017-03-20 | End: 2017-03-20

## 2017-03-20 RX ORDER — ONDANSETRON 2 MG/ML
4 INJECTION INTRAMUSCULAR; INTRAVENOUS PRN
Status: ACTIVE | OUTPATIENT
Start: 2017-03-20 | End: 2017-03-20

## 2017-03-20 RX ORDER — FLUMAZENIL 0.1 MG/ML
INJECTION INTRAVENOUS
Status: COMPLETED
Start: 2017-03-20 | End: 2017-03-20

## 2017-03-20 RX ORDER — SODIUM CHLORIDE 9 MG/ML
500 INJECTION, SOLUTION INTRAVENOUS PRN
Status: DISCONTINUED | OUTPATIENT
Start: 2017-03-20 | End: 2017-03-24 | Stop reason: HOSPADM

## 2017-03-20 RX ORDER — LABETALOL HYDROCHLORIDE 5 MG/ML
10 INJECTION, SOLUTION INTRAVENOUS EVERY 4 HOURS PRN
Status: DISCONTINUED | OUTPATIENT
Start: 2017-03-20 | End: 2017-03-24 | Stop reason: HOSPADM

## 2017-03-20 RX ORDER — NALOXONE HYDROCHLORIDE 0.4 MG/ML
INJECTION, SOLUTION INTRAMUSCULAR; INTRAVENOUS; SUBCUTANEOUS
Status: COMPLETED
Start: 2017-03-20 | End: 2017-03-20

## 2017-03-20 RX ADMIN — CEFEPIME 2 G: 2 INJECTION, POWDER, FOR SOLUTION INTRAVENOUS at 18:01

## 2017-03-20 RX ADMIN — METRONIDAZOLE 500 MG: 500 INJECTION, SOLUTION INTRAVENOUS at 18:59

## 2017-03-20 RX ADMIN — METRONIDAZOLE 500 MG: 500 INJECTION, SOLUTION INTRAVENOUS at 08:55

## 2017-03-20 RX ADMIN — SODIUM CHLORIDE: 9 INJECTION, SOLUTION INTRAVENOUS at 06:09

## 2017-03-20 RX ADMIN — SODIUM CHLORIDE: 9 INJECTION, SOLUTION INTRAVENOUS at 18:11

## 2017-03-20 RX ADMIN — CEFEPIME 2 G: 2 INJECTION, POWDER, FOR SOLUTION INTRAVENOUS at 04:04

## 2017-03-20 RX ADMIN — FAMOTIDINE 20 MG: 20 TABLET, FILM COATED ORAL at 20:43

## 2017-03-20 RX ADMIN — MORPHINE SULFATE 2 MG: 4 INJECTION INTRAVENOUS at 11:38

## 2017-03-20 RX ADMIN — METRONIDAZOLE 500 MG: 500 INJECTION, SOLUTION INTRAVENOUS at 00:46

## 2017-03-20 ASSESSMENT — ENCOUNTER SYMPTOMS
VOMITING: 0
ABDOMINAL PAIN: 1
CHILLS: 0
FEVER: 0
NAUSEA: 0

## 2017-03-20 ASSESSMENT — PATIENT HEALTH QUESTIONNAIRE - PHQ9
2. FEELING DOWN, DEPRESSED, IRRITABLE, OR HOPELESS: NOT AT ALL
SUM OF ALL RESPONSES TO PHQ QUESTIONS 1-9: 0
1. LITTLE INTEREST OR PLEASURE IN DOING THINGS: NOT AT ALL
SUM OF ALL RESPONSES TO PHQ9 QUESTIONS 1 AND 2: 0

## 2017-03-20 ASSESSMENT — PAIN SCALES - GENERAL
PAINLEVEL_OUTOF10: 0
PAINLEVEL_OUTOF10: 0
PAINLEVEL_OUTOF10: 8
PAINLEVEL_OUTOF10: 0
PAINLEVEL_OUTOF10: 2
PAINLEVEL_OUTOF10: 0

## 2017-03-20 NOTE — THERAPY
"Speech Language Therapy Evaluation completed to address speech, communication and cognition    Functional Status: Patient AAOx2 with family at bedside who stated patient confused however mentation close to baseline. Family reported patient has been living independently however concerned for patient to return home independently d/t declining mentation and ability to care for herself. Portions of standardized tests revealed deficits in the moderate-to-severe range in STM, attention, thought organization, reasoning, clock drawing, and writing. Min-mod deficits noted in complex Y/N questions and auditory comprehension at the paragraph level. Patient unable to complete reading comprehension task despite max assist and becoming frustrated. Patient with poor insight into deficits. At this time, patient is not at the level for independent living and would recommend 24-hour supervision with IADLs upon discharge. Defer to PT and OT evaluation for functional status on ADLs.      Recommendations: 1) Patient is not safe for independent living and would benefit from 24-hour supervision with IADLs upon discharge, 2) Recommend PT and OT evaluation to determine functional status with ADLs     Plan of Care: Will benefit from Speech Therapy 3 times per week    Post-Acute Therapy: Discharge to home with outpatient or home health for additional skilled therapy services.    See \"Rehab Therapy-Acute\" Patient Summary Report for complete documentation. Thank you for the consult.       "

## 2017-03-20 NOTE — ED NOTES
Spoke with Karyn to inform WALTER Lagunas that patient is on the way and that I am available for any questions or concerns regarding the patient.  Preparing for transfer via gurney w/ tele/O2.  No change in condition on departing ED.

## 2017-03-20 NOTE — FLOWSHEET NOTE
03/19/17 1829   Events/Summary/Plan   Events/Summary/Plan Initial RCP assessment, svn given   Interdisciplinary Plan of Care-Goals (Indications)   Obstructive Ventilatory Defect or Pulmonary Disease without Obvious Obstruction History / Diagnosis   Interdisciplinary Plan of Care-Outcomes    Bronchodilator Outcome Improved Vital Signs and Measures of Gas Exchange   Education   Education Yes - Pt. / Family has been Instructed in use of Respiratory Equipment;Yes - Pt. / Family has been Instructed in use of Respiratory Medications and Adverse Reactions   SVN Group   #SVN Performed Yes   Given By: Mask   Respiratory WDL   Respiratory (WDL) X   Chest Exam   Work Of Breathing / Effort Mild;Shallow   Respiration 18  (post 18)   Pulse 69  (post 73)   Heart Rate (Monitored) 69   Breath Sounds   Pre/Post Intervention Pre Intervention Assessment  (slight increase in aeration, pt appears less sob)   RUL Breath Sounds Diminished   RML Breath Sounds Diminished   RLL Breath Sounds Diminished   JOSELYN Breath Sounds Diminished   LLL Breath Sounds Diminished   Oximetry   #Pulse Oximetry (Single Determination) Yes   Oxygen   Home O2 Use Prior To Admission? Yes   Home O2 LPM Flow 2 LPM   Home O2 Delivery Method Silicone Nasal Cannula   Home O2 Frequency of Use Continuous   Pulse Oximetry 100 %   O2 (LPM) 2   O2 Daily Delivery Respiratory  Silicone Nasal Cannula

## 2017-03-20 NOTE — PROGRESS NOTES
Report given to Antwon WATSON on tele 8. [facility] ETA of 2030. All questions and concerns addressed. Pt and family updated.

## 2017-03-20 NOTE — CONSULTS
DATE OF SERVICE:  03/19/2017    ADMISSION DIAGNOSES:  Acute cholecystitis and choledocholithiasis.    CHIEF COMPLAINT:  Not feeling well.    REQUESTING PHYSICIAN:  Dr. Montilla.    HISTORY OF PRESENT ILLNESS:  This is an 82-year-old woman with dementia who   has been difficult to take a history from.  From what I can gather from the   patient herself and her family members who were at bedside, she has had about   a 3 or 4-year history of low appetite and intermittent abdominal pain.  This   does not seem to have gotten worse recently.  Her son found her today slumped   over in a chair and brought her to the emergency department with a chief   complaint of not feeling well.  She does not remember having any nausea or   vomiting or any fevers or chills at home.    PAST MEDICAL HISTORY:  COPD for which she is not using her oxygen most of the   time and dementia.    PAST SURGICAL HISTORY:  None.    MEDICATIONS:  None.    ALLERGIES:  No known drug allergies.    SOCIAL HISTORY:  She lives here in [place] on her own with her son nearby.  She   does not use tobacco, alcohol, or any other drugs.    FAMILY HISTORY:  No problems with bleeding or anesthesia.    REVIEW OF SYSTEMS:  Otherwise, positive only for shortness of breath.    Otherwise, 12-system review is negative except as described above in the HPI.    PHYSICAL EXAMINATION:  VITAL SIGNS:  Temperature is 97.4, pulse is 78, respiratory rate 20, blood   pressure 143/61, O2 sat is 100% on 2 liters nasal cannula.  Weight is 34.8   kilos.  GENERAL:  She is alert and oriented x2, in no acute distress.  HEENT:  Normocephalic, atraumatic.  Extraocular movements are intact.  Sclerae   are anicteric.  Mucous membranes are somewhat dry.  NECK:  Supple, no lymphadenopathy or thyromegaly.  HEART:  Regular rate.  LUNGS:  Clear.  ABDOMEN:  Soft.  Mildly tender in the right upper quadrant and right mid   abdomen without any Ornelas sign.  No rebound or guarding.  EXTREMITIES:  No  clubbing, cyanosis or edema.  VASCULAR:  2+ radial and DP pulses bilaterally.    LABORATORY DATA:  Show a white blood cell count of 25.4, H and H of 13.7 and   42.2, platelets of 306, left shift with 88% neutrophils.  Chemistry shows a   sodium of 138, potassium 4.3, chloride 96, bicarbonate 29, glucose of 146, BUN   29, creatinine 1.2, calcium 9.1, AST of 23, ALT of 18, alkaline phosphatase   of 131, which is actually down from routine labs from a couple days ago where   the alkaline phosphatase was 199, total bilirubin 1.5, albumin 3.4, total   protein 6.3, lipase 15.  Lactate initially on admission is 3.56 and is now   down to 2.49.  Troponin is less than 0.02.  PT is 17.5, INR is 1.4.  PTT is   26.5.  Urinalysis shows some proteinuria, but no signs of bleeding or   infection.  No culture is indicated.    IMAGING:  A CT of the abdomen and pelvis shows a markedly distended   gallbladder with gallbladder wall thickening and pericholecystic   fluid/inflammation with apparent gallstone in the gallbladder neck consistent   with acute cholecystitis with intra and extrahepatic biliary ductal   dilatation, which appears due to choledocholithiasis, diverticulosis without   evidence of diverticulitis, aortic atherosclerosis and bilateral renal cysts.    On my review of the gallbladder is markedly distended with thickened wall.    The fundus of the gallbladder reaches down almost below the umbilicus.  I   cannot visualize a cystic duct, but she has a very large common bile duct as   well.  A CT of the head shows cerebral atrophy, white matter lucencies most   consistent with small vessel ischemic changes versus demyelination or gliosis,   otherwise no acute findings.  X-ray of the chest shows enlarged cardiac   silhouette without evidence of acute disease.    ASSESSMENT:  This is an 82-year-old woman with dementia and chronic   obstructive pulmonary disease who presents with likely acute cholecystitis and    choledocholithiasis.  Given her comorbidities and the severe inflammation and   enlargement of her gallbladder, I think her chances of needing an open   cholecystectomy should I attempt surgery at this time is greater than 60%.    Again, given her comorbidities, I do not think that this would be the best   course of action for her and so I have recommended that she have a   cholecystostomy tube placed to address her acute cholecystitis.    Unfortunately, that cannot be done here at [facility] and so I have   discussed with Dr. Medellin transfer to the [facility] for this therapy.    She will also need a GI consult and clearance of her common bile duct, though   currently there is only minimal changes in her liver function tests.  I   discussed with the patient's family that she will need her gallbladder out in   the future and that I would wait a minimum of 6-8 weeks in order to consider   this.  They had questions about breaking up the stones similar to kidney   stones and I explained that this unfortunately does not work in the long-term   and can put you at risk for pancreatitis and cholangitis and so that is not   recommended.  All of their other questions were answered to their apparent   satisfaction.  She will be transferred to the [facility] for further   treatment and I will follow her there.       ____________________________________     MD AROLDO Woods / KIMBERLY    DD:  03/19/2017 19:11:28  DT:  03/19/2017 19:34:40    D#:  [id number]  Job#:  [id number]

## 2017-03-20 NOTE — PROGRESS NOTES
Report received from Lisa WATSON. Pt A&Ox 2, denies pain or any other needs at this time. Fall precautions in place with call light in reach. POC discussed. Pt to transfer to [facility]. [place] to sign COBRA and transfer will be initiated. All questions and concerns addressed. Pt and family verbalized understanding.

## 2017-03-20 NOTE — PROGRESS NOTES
"Surgical Progress Note    Author: Lucille C Sherri Date & Time created: 3/20/2017   12:07 PM     Interval Events:  \"I feel about the same as yesterday.\" Family states she has only complained of pain since her cholecystostomy tube was placed. No N/V. Would like some clear liquids.    Review of Systems   Constitutional: Negative for fever and chills.   Gastrointestinal: Positive for abdominal pain. Negative for nausea and vomiting.     Hemodynamics:  Temp (24hrs), Av.6 °C (97.8 °F), Min:36.3 °C (97.3 °F), Max:36.8 °C (98.2 °F)  Temperature:  (patient in procedure; not in room )  Pulse  Av  Min: 52  Max: 88   Blood Pressure : (!) 175/71 mmHg     Respiratory:    Respiration: 18, Pulse Oximetry: 95 %, O2 Daily Delivery Respiratory : Silicone Nasal Cannula     Work Of Breathing / Effort: Mild  RUL Breath Sounds: Clear, RML Breath Sounds: Clear, RLL Breath Sounds: Diminished, JOSELYN Breath Sounds: Clear, LLL Breath Sounds: Diminished  Neuro:  GCS       Fluids:  No intake or output data in the 24 hours ending 17 1207  Weight: 34.8 kg (76 lb 11.5 oz) (per [facility] report)  Current Diet Order   Procedures   • DIET ORDER     Physical Exam   Constitutional: She appears well-developed. No distress.   HENT:   Head: Normocephalic and atraumatic.   Eyes: Conjunctivae are normal.   Neck: Normal range of motion.   Cardiovascular: Normal rate.    Pulmonary/Chest: Effort normal.   Abdominal: Soft. She exhibits no distension. There is tenderness.   Right upper quadrant tenderness similar to yesterday's exam. No rebound or guarding.    Musculoskeletal: Normal range of motion.   Neurological: She is alert.   Skin: Skin is warm and dry. She is not diaphoretic.   Psychiatric: She has a normal mood and affect. Her behavior is normal.     Labs:  Recent Results (from the past 24 hour(s))   CBC WITH DIFFERENTIAL    Collection Time: 17  2:40 PM   Result Value Ref Range    WBC 25.4 (H) 4.8 - 10.8 K/uL    RBC 4.41 4.20 - " 5.40 M/uL    Hemoglobin 13.7 12.0 - 16.0 g/dL    Hematocrit 42.2 37.0 - 47.0 %    MCV 95.7 81.4 - 97.8 fL    MCH 31.1 27.0 - 33.0 pg    MCHC 32.5 (L) 33.6 - 35.0 g/dL    RDW 46.9 35.9 - 50.0 fL    Platelet Count 306 164 - 446 K/uL    MPV 11.2 9.0 - 12.9 fL    Neutrophils-Polys 88.60 (H) 44.00 - 72.00 %    Lymphocytes 2.40 (L) 22.00 - 41.00 %    Monocytes 8.20 0.00 - 13.40 %    Eosinophils 0.00 0.00 - 6.90 %    Basophils 0.20 0.00 - 1.80 %    Immature Granulocytes 0.60 0.00 - 0.90 %    Nucleated RBC 0.00 /100 WBC    Neutrophils (Absolute) 22.47 (H) 2.00 - 7.15 K/uL    Lymphs (Absolute) 0.62 (L) 1.00 - 4.80 K/uL    Monos (Absolute) 2.09 (H) 0.00 - 0.85 K/uL    Eos (Absolute) 0.00 0.00 - 0.51 K/uL    Baso (Absolute) 0.04 0.00 - 0.12 K/uL    Immature Granulocytes (abs) 0.14 (H) 0.00 - 0.11 K/uL    NRBC (Absolute) 0.00 K/uL   COMP METABOLIC PANEL    Collection Time: 03/19/17  2:40 PM   Result Value Ref Range    Sodium 138 135 - 145 mmol/L    Potassium 4.3 3.6 - 5.5 mmol/L    Chloride 96 96 - 112 mmol/L    Co2 29 20 - 33 mmol/L    Anion Gap 13.0 (H) 0.0 - 11.9    Glucose 146 (H) 65 - 99 mg/dL    Bun 29 (H) 8 - 22 mg/dL    Creatinine 1.20 0.50 - 1.40 mg/dL    Calcium 9.1 8.4 - 10.2 mg/dL    AST(SGOT) 23 12 - 45 U/L    ALT(SGPT) 18 2 - 50 U/L    Alkaline Phosphatase 131 (H) 30 - 99 U/L    Total Bilirubin 1.5 0.1 - 1.5 mg/dL    Albumin 3.4 3.2 - 4.9 g/dL    Total Protein 6.3 6.0 - 8.2 g/dL    Globulin 2.9 1.9 - 3.5 g/dL    A-G Ratio 1.2 g/dL   LIPASE    Collection Time: 03/19/17  2:40 PM   Result Value Ref Range    Lipase 15 7 - 58 U/L   TROPONIN    Collection Time: 03/19/17  2:40 PM   Result Value Ref Range    Troponin I <0.02 0.00 - 0.04 ng/mL   LACTIC ACID    Collection Time: 03/19/17  2:40 PM   Result Value Ref Range    Lactic Acid 3.56 (H) 0.50 - 2.00 mmol/L    Specimen Venous    BLOOD CULTURE x2    Collection Time: 03/19/17  2:40 PM   Result Value Ref Range    Significant Indicator NEG     Source BLD     Site  Peripheral     Blood Culture       No Growth    Note: Blood cultures are incubated for 5 days and  are monitored continuously.Positive blood cultures  are called to the RN and reported as soon as  they are identified.  Blood culture testing and Gram stain, if indicated, are  performed at [facility], [address] [place] [address].  Positive blood cultures are  sent to [facility], [address] [place] [address], for organism identification and  susceptibility testing.     ESTIMATED GFR    Collection Time: 17  2:40 PM   Result Value Ref Range    GFR If  52 (A) >60 mL/min/1.73 m 2    GFR If Non  43 (A) >60 mL/min/1.73 m 2   Prothrombin time (INR)    Collection Time: 17  2:40 PM   Result Value Ref Range    PT 17.5 (H) 12.0 - 14.6 sec    INR 1.46 (H) 0.87 - 1.13   APTT    Collection Time: 17  2:40 PM   Result Value Ref Range    APTT 26.8 24.7 - 36.0 sec   EKG (NOW)    Collection Time: 17  2:46 PM   Result Value Ref Range    Report       [facility] Emergency Dept.    Test Date:  2017  Pt Name:    MARIAH MATSON            Department: [facility]  MRN:        [id number]                      Room:       3312  Gender:     F                            Technician: MAYRA  :        [id number]                   Requested By:JUNITO VALDEZ  Order #:    [id number]                    Reading MD:    Measurements  Intervals                                Axis  Rate:       81                           P:          -23  ID:         [id number]                          QRS:        -2  QRSD:       72                           T:          81  QT:         384  QTc:        446    Interpretive Statements  SINUS RHYTHM  No previous ECG available for comparison     URINALYSIS CULTURE, IF INDICATED    Collection Time: 17  2:55 PM   Result Value Ref Range    Micro Urine Req Microscopic     Color Yellow     Character  Clear     Ph 5.5 5.0-8.0    Glucose Negative Negative mg/dL    Ketones Trace (A) Negative mg/dL    Protein 100 (A) Negative mg/dL    Nitrite Negative Negative    Leukocyte Esterase Negative Negative    Occult Blood Negative Negative    Culture Indicated No UA Culture   REFRACTOMETER SG    Collection Time: 03/19/17  2:55 PM   Result Value Ref Range    Specific Gravity 1.025    UR BILI ICTOTEST    Collection Time: 03/19/17  2:55 PM   Result Value Ref Range    Bilirubin Negative Negative   URINE MICROSCOPIC (W/UA)    Collection Time: 03/19/17  2:55 PM   Result Value Ref Range    WBC 2-5 /hpf    RBC 0-2 /hpf    Bacteria Few (A) None /hpf    Epithelial Cells Few Few /hpf    Mucous Threads Moderate /hpf    Urine Crystals Mod Amorphous /hpf    Urine Casts 6-10 Hyaline (A) /lpf   BLOOD CULTURE x2    Collection Time: 03/19/17  3:16 PM   Result Value Ref Range    Significant Indicator NEG     Source BLD     Site Peripheral     Blood Culture       No Growth    Note: Blood cultures are incubated for 5 days and  are monitored continuously.Positive blood cultures  are called to the RN and reported as soon as  they are identified.  Blood culture testing and Gram stain, if indicated, are  performed at [facility], [address] [place] [address].  Positive blood cultures are  sent to [facility], [address] [place] [address], for organism identification and  susceptibility testing.     Lactic Acid -STAT Once    Collection Time: 03/19/17  5:10 PM   Result Value Ref Range    Lactic Acid 2.49 (H) 0.50 - 2.00 mmol/L    Specimen Venous    Lactic Acid Every four hours after STAT order    Collection Time: 03/19/17  8:37 PM   Result Value Ref Range    Lactic Acid 3.38 (H) 0.50 - 2.00 mmol/L    Specimen Venous    Lactic Acid Every four hours after STAT order    Collection Time: 03/19/17 10:07 PM   Result Value Ref Range    Lactic Acid 3.0 (H) 0.5 - 2.0 mmol/L   ACCU-CHEK GLUCOSE     Collection Time: 03/19/17 11:05 PM   Result Value Ref Range    Glucose - Accu-Ck 105 (H) 65 - 99 mg/dL   CBC with Differential    Collection Time: 03/20/17  1:19 AM   Result Value Ref Range    WBC 19.5 (H) 4.8 - 10.8 K/uL    RBC 3.70 (L) 4.20 - 5.40 M/uL    Hemoglobin 11.4 (L) 12.0 - 16.0 g/dL    Hematocrit 34.8 (L) 37.0 - 47.0 %    MCV 94.1 81.4 - 97.8 fL    MCH 30.8 27.0 - 33.0 pg    MCHC 32.8 (L) 33.6 - 35.0 g/dL    RDW 46.5 35.9 - 50.0 fL    Platelet Count 227 164 - 446 K/uL    MPV 11.3 9.0 - 12.9 fL    Nucleated RBC 0.00 /100 WBC    NRBC (Absolute) 0.00 K/uL    Neutrophils-Polys 87.70 (H) 44.00 - 72.00 %    Lymphocytes 9.60 (L) 22.00 - 41.00 %    Monocytes 1.80 0.00 - 13.40 %    Eosinophils 0.00 0.00 - 6.90 %    Basophils 0.00 0.00 - 1.80 %    Neutrophils (Absolute) 17.28 (H) 2.00 - 7.15 K/uL    Lymphs (Absolute) 1.87 1.00 - 4.80 K/uL    Monos (Absolute) 0.35 0.00 - 0.85 K/uL    Eos (Absolute) 0.00 0.00 - 0.51 K/uL    Baso (Absolute) 0.00 0.00 - 0.12 K/uL   Phosphorus    Collection Time: 03/20/17  1:19 AM   Result Value Ref Range    Phosphorus 3.0 2.5 - 4.5 mg/dL   COMP METABOLIC PANEL    Collection Time: 03/20/17  1:19 AM   Result Value Ref Range    Sodium 140 135 - 145 mmol/L    Potassium 3.6 3.6 - 5.5 mmol/L    Chloride 107 96 - 112 mmol/L    Co2 25 20 - 33 mmol/L    Anion Gap 8.0 0.0 - 11.9    Glucose 115 (H) 65 - 99 mg/dL    Bun 29 (H) 8 - 22 mg/dL    Creatinine 0.86 0.50 - 1.40 mg/dL    Calcium 8.3 (L) 8.5 - 10.5 mg/dL    AST(SGOT) 13 12 - 45 U/L    ALT(SGPT) 12 2 - 50 U/L    Alkaline Phosphatase 99 30 - 99 U/L    Total Bilirubin 1.0 0.1 - 1.5 mg/dL    Albumin 2.9 (L) 3.2 - 4.9 g/dL    Total Protein 5.3 (L) 6.0 - 8.2 g/dL    Globulin 2.4 1.9 - 3.5 g/dL    A-G Ratio 1.2 g/dL   LACTIC ACID    Collection Time: 03/20/17  1:19 AM   Result Value Ref Range    Lactic Acid 1.5 0.5 - 2.0 mmol/L   ESTIMATED GFR    Collection Time: 03/20/17  1:19 AM   Result Value Ref Range    GFR If  >60 >60  mL/min/1.73 m 2    GFR If Non African American >60 >60 mL/min/1.73 m 2   DIFFERENTIAL MANUAL    Collection Time: 03/20/17  1:19 AM   Result Value Ref Range    Bands-Stabs 0.90 0.00 - 10.00 %    Manual Diff Status PERFORMED    PERIPHERAL SMEAR REVIEW    Collection Time: 03/20/17  1:19 AM   Result Value Ref Range    Peripheral Smear Review see below    PLATELET ESTIMATE    Collection Time: 03/20/17  1:19 AM   Result Value Ref Range    Plt Estimation Normal    MORPHOLOGY    Collection Time: 03/20/17  1:19 AM   Result Value Ref Range    RBC Morphology Normal    LACTIC ACID    Collection Time: 03/20/17  5:33 AM   Result Value Ref Range    Lactic Acid 0.8 0.5 - 2.0 mmol/L   ACCU-CHEK GLUCOSE    Collection Time: 03/20/17  6:08 AM   Result Value Ref Range    Glucose - Accu-Ck 105 (H) 65 - 99 mg/dL     Medical Decision Making, by Problem:  There are no hospital problems to display for this patient.    Plan:  S/p cholecystostomy tube placement. Keep to gravity drainage. Ok to start clear liquids, stop if she has increased pain, nausea or vomiting. Will follow up bile cultures. Continue broad spectrum antibiotics.   Recommend GI consult for evaluation for ERCP for possible choledocholithiasis.   Will follow.     Quality Measures:  Labs reviewed  Solis catheter: No Solis                    Discussed patient condition with Family, RN and Patient

## 2017-03-20 NOTE — OR SURGEON
Immediate Post- Operative Note        PostOp Diagnosis: CHOLECYSTITIS      Procedure(s): PERC CHOLECYSTOSTOMY  8F LOCKING LOOP PLACED UNDER CT GUIDANCE. PURULENT MALODOROUS BILE. SPECIMEN SENT FOR C+S, GRAM      Estimated Blood Loss: Less than 1 ml        Complications: None            3/20/2017     10:59 AM     Vicente Cheney

## 2017-03-20 NOTE — PROGRESS NOTES
Patient admitted to unit with son at bedside helping to answer questions. Son Dwight states that patient lives alone and that she has been confused for years. Will contact . Patient on 2 liters o2 which she also wears at home, alert and oriented x 1-2 knows name and president, unsure of which hospital, skin is intact, buttocks, coccyx area bony and pink, incontinent of urine, c/o sharp right upper quadrant pain. Family at bedside, call light in reach.

## 2017-03-20 NOTE — PROGRESS NOTES
txd from [facility].  Pt resting comfortable.  Plan cw atbs for cholecystitis -  surgery to assess for aurelia.

## 2017-03-20 NOTE — PROGRESS NOTES
IR Procedure Note:    Dr. Cheney performed a Cholecystomy Drain placement. The pt tolerated the procedure well, vital signs stable.  Sutures applied to drain, CDI and soft.  Report given to Kaushik WATSON. Pt and family transported back to room.  1cc cholecystomy aspirate taken to lab.

## 2017-03-20 NOTE — PROGRESS NOTES
General Surgery  83 yo woman with dementia with 3-4 year history of lower appetite and intermittent abdominal pain. Found by her son today slumped over in her chair and brought to ED. She states she just doesn't feel well.   PMH COPD, dementia  PSH none  Meds none  ALL NKDA  A&O x 2  RRR  Abdomen soft, tender along the right side, no Ornelas's sign  WBC 25  CT with markedly enlarged gallbladder, dilated common duct with stone in the distal duct  A/P: Given comorbidities and severity of inflammation, I recommended cholecystostomy tube decompression, GI consult for ERCP and clearance of the duct, and interval cholecystectomy in 6-8 weeks. Discussed current high risk of needing an open cholecystectomy (at least 60%) would would also give her a much longer recovery time and likely some ICU time as well. Family had all of their questions answered to their satisfaction. Discussed with Dr. Medellin, she will transfer patient to the main for IR placement of cholecystostomy tube. I will continue to follow the patient there.   Dictation: [id number]

## 2017-03-20 NOTE — CARE PLAN
Problem: Communication  Goal: The ability to communicate needs accurately and effectively will improve  Outcome: PROGRESSING AS EXPECTED  Pt uses call light when in need of assistance    Problem: Venous Thromboembolism (VTW)/Deep Vein Thrombosis (DVT) Prevention:  Goal: Patient will participate in Venous Thrombosis (VTE)/Deep Vein Thrombosis (DVT)Prevention Measures  Outcome: PROGRESSING AS EXPECTED  Pt using SCD's for VTE protocol

## 2017-03-20 NOTE — H&P
"CHIEF COMPLAINT:  Weakness and poor intake.    HISTORY OF PRESENT ILLNESS:  This is an 82-year-old female who lives alone.    She has a son who has power of  checked on her and found her slumped   in the chair.  Lately, he has noted increasing weakness and poor oral intake.    She is supposed to be on home oxygen at 2 liters per nasal cannula at all   times, but really she has not been wearing this or compliant with this lately.    He also notes that she has been having intermittent problems with mentation   for some years.  She recently started with a new primary care physician.  They   had labs done on Friday, which showed a low B12 level.  Patient has   apparently been feeling poorly for about the last week.  She has been having   pain off and on in the epigastric to right upper quadrant area.  She is very   vague with regards to her history, but does indicate an area just to the right   of midline under her ribs.  She will not quantify in terms of numbers.  She   says it is \"not that bad\".  It has been intermittent in nature.  Her appetite   has been poor.  She has been eating and drinking very poorly.  She has not had   any emesis.  She says her bowel movements have been normal in color or   amount.  She denies any diarrhea.  She denies any overt nausea.  She denies   any fever, but has had chills and sweats.  She has felt increasingly dizzy and   weak.  She has perhaps some dysuria, but she is not really sure.  She has   felt some shortness of breath, but no chest pain, no headache, and no cough.    She has never had prior similar symptoms, additional history as related in the   concerns verbalized by her son.    PAST MEDICAL HISTORY AND PAST SURGICAL HISTORY:  Significant for breast cancer   in the 1990s.  She had a right mastectomy and chemotherapy.  She also has a   history of COPD and she is supposed to be on 2 liters oxygen 24/7.  She is not   on any other medications.  Past surgical history is " otherwise, negative.    FAMILY MEDICAL HISTORY:  Positive for diabetes, but negative for heart disease   or cancers.    SOCIAL HISTORY:  She is .  She is still friends with her ex-   who checks on her.  She has one son who is her power of  who also   checks on her.  She, however, lives alone, which is concerning to the son.    The patient does not smoke or drink.    CODE STATUS:  She does not want to be resuscitated in the event of a cardiac   arrest, but mechanical ventilation for respiratory issues is okay.    REVIEW OF SYSTEMS:  A 12-point review of systems is covered and as noted   above, otherwise, negative per AMA and CMS criteria.    PHYSICAL EXAMINATION:  VITAL SIGNS:  She is afebrile with a temperature of 36.3, pulse 78,   respirations 20, and blood pressure 143/61.  She is saturating 100% on 2   liters.  GENERAL:  This is a frail elderly  female.  She is oriented and   reasonably appropriate, but is a somewhat reluctant and difficult historian   and very vague at times.  HEENT:  Her pupils are equal and reactive.  She has arcus senilis bilaterally   in her iris.  Her extraocular movements are intact.  Her sclerae are mildly   dry and there may be some very, very mild scleral icterus as well.  Her   oropharynx is not overtly dry, but there is decreased salivary pooling.  NECK:  Supple.  LUNGS:  Sounds are markedly diminished, hard to tell if it is from decreased   effort or from her COPD as she does seem to be splinting a little from her   abdominal pain, but overall, her breath sounds are rather poor.  There are no   wheezes, rhonchi, or rales, however.  HEART:  Regular rate and rhythm without any murmurs, rubs, or gallops.  ABDOMEN:  Somewhat distended with palpable bloaty enlargement of the right   upper quadrant area with marked tenderness.  There is no guarding or rebound.    She is just quite tender in this area.  Bowel sounds are overall diminished.    Lower quadrants  are less tender, but she does have some referred pain.  Her   abdominal wall musculature is overall _____.  EXTREMITIES:  Show no clubbing, cyanosis, or edema.  NEUROLOGICAL:  Despite generalized weakness she is neurologically intact.    LABORATORY STUDIES AND DIAGNOSTIC IMAGING:  Her white count is elevated at   25.4, H and H are normal, and platelet count is 306,000.  She has 88% polys,   elevated immature granulocytes, and no bands are noted.  CHEMISTRY PROFILE:    Her sodium is 138, potassium 4.3, chloride 96, bicarb 29, and anion gap is 13.    It was normal on the 14th, BUN 29, it was 16 on the 14th, and creatinine is   1.2, it was 0.74 on the 14th.  Transaminases are normal.  Her alkaline   phosphatase is 131, which was actually lower than on the 14th when it was 199.    Her bilirubin is 1.5, albumin is normal.  Lactate initially is 3.56 and   subsequent is 2.49.  Troponin less than 0.02.  INR is 1.46.  Urinalysis shows   100 of protein, trace ketones, negative nitrate, and negative leukocyte   esterase.  The labs on the 14th additionally show profoundly low B12 of 80,   folate is normal.  Vitamin D is a little low at 28.  TSH was normal.  CT of   the abdomen and pelvis shows markedly distended gallbladder with gallbladder   wall thickening and pericholecystic fluid and inflammation with apparent   gallstone in the gallbladder neck consistent with acute cholecystitis with   intra and extrahepatic biliary dilation, which appears due to   choledocholithiasis.  She is incidentally noted to have diverticulosis, but no   diverticulitis, atherosclerosis of her aorta, and bilateral renal cysts.  CT   of the head was also performed due to her weakness and altered mentation   showing cerebral atrophy, white matter lucencies consistent with small vessel   changes.  No acute changes.  Chest x-ray shows clear lungs and cardiomegaly   with what to me looks like, right-sided enlargement.    ASSESSMENT:  1.  Sepsis.  The  patient has moderate sepsis secondary to acute cholecystitis   secondary to gallbladder neck obstruction with stone.  She has subsequently   been seen by Dr. Lucille Polanco and is recommending transfer to [facility]   for cholecystotomy and closer observation with surgery.  Plan is the patient   is a bit more stable.  She has been started on cefepime and Flagyl.  Blood   cultures are pending at this time.  2.  Chronic obstructive pulmonary disease with chronic respiratory failure and   hypoxemia.  The patient is not wheezing presently, should remain on   respiratory therapy protocol.  3.  Profound B12 deficiency, I did discuss this with the patient's family at   the time of admission.  We will start her on intramuscular B12.  She can   possibly be transitioned to high-dose oral therapy in the future once her   level is normalized, but she needs to have this level continuously monitored.  4.  Questionable mild dementia, this could be due to the B12 deficiency and   how she will function in the home setting following discharge will depend on   her response to replacement of her B12.  5.  Sepsis.  The patient is maintaining pressures.  She is not tachycardic.    She is not having any evidence of respiratory failure.  She has some mild   renal insufficiency, which could be due to sepsis, but cannot rule out that   she is just a little bit dehydrated and clinically, she does look somewhat   dry.  We will hydrate her and monitor her laboratory studies.  6.  Mild coagulopathy.  This is likely secondary to poor nutrition and should   not preclude invasive procedures at this point.  We will put her on sequential   compression devices for deep venous thrombosis prophylaxis until she has had   her cholecystotomy performed.  Plan of care was discussed with the patient at   the time of admission and updated with the family by Dr. Polanco after   consultation.  They are aware of patient's impending transfer to [facility].       ____________________________________     MD GERONIMO PALUMBO / KIMBERLY    DD:  03/19/2017 19:06:23  DT:  03/19/2017 19:52:37    D#:  [id number]  Job#:  [id number]

## 2017-03-20 NOTE — PROGRESS NOTES
2 RN skin Check with Joe WATSON.    Scabbing on mid back, healing with no signs of active bleeding.  Sacrum red but blanching.  Heels dry bilaterally.  All other skin intact with no signs of breakdown

## 2017-03-20 NOTE — THERAPY
Pt having surgery tomorrow. Will try back after surgery for assessment. Discussed w/ RN who agrees w/ POC.

## 2017-03-20 NOTE — PROGRESS NOTES
[facility] Dr. Vignesh Augustin for Sepsis and Acute cholecystitis.  Lactic acid=2.49.  Discharge and readmit orders signed and held, need to be released upon pt arrival.  Pt coming by ground.

## 2017-03-20 NOTE — PROGRESS NOTES
[facility] at the bedside for transfer. Pt in stable condition upon leaving. All questions and concerns addressed appropriately.

## 2017-03-21 ENCOUNTER — APPOINTMENT (OUTPATIENT)
Dept: RADIOLOGY | Facility: MEDICAL CENTER | Age: 82
DRG: 872 | End: 2017-03-21
Attending: INTERNAL MEDICINE
Payer: MEDICARE

## 2017-03-21 PROBLEM — K81.0 CHOLECYSTITIS, ACUTE: Status: ACTIVE | Noted: 2017-03-21

## 2017-03-21 PROBLEM — D64.9 NORMOCYTIC ANEMIA: Status: ACTIVE | Noted: 2017-03-21

## 2017-03-21 PROBLEM — E87.6 HYPOKALEMIA: Status: ACTIVE | Noted: 2017-03-21

## 2017-03-21 PROBLEM — J42 CHRONIC BRONCHITIS (HCC): Status: ACTIVE | Noted: 2017-03-21

## 2017-03-21 PROBLEM — D72.829 LEUCOCYTOSIS: Status: ACTIVE | Noted: 2017-03-21

## 2017-03-21 LAB
ALBUMIN SERPL BCP-MCNC: 2.2 G/DL (ref 3.2–4.9)
ALBUMIN/GLOB SERPL: 0.9 G/DL
ALP SERPL-CCNC: 71 U/L (ref 30–99)
ALT SERPL-CCNC: 10 U/L (ref 2–50)
ANION GAP SERPL CALC-SCNC: 9 MMOL/L (ref 0–11.9)
AST SERPL-CCNC: 13 U/L (ref 12–45)
BASOPHILS # BLD AUTO: 0 % (ref 0–1.8)
BASOPHILS # BLD: 0 K/UL (ref 0–0.12)
BILIRUB SERPL-MCNC: 0.9 MG/DL (ref 0.1–1.5)
BUN SERPL-MCNC: 25 MG/DL (ref 8–22)
CALCIUM SERPL-MCNC: 7.4 MG/DL (ref 8.5–10.5)
CHLORIDE SERPL-SCNC: 109 MMOL/L (ref 96–112)
CO2 SERPL-SCNC: 22 MMOL/L (ref 20–33)
CREAT SERPL-MCNC: 0.69 MG/DL (ref 0.5–1.4)
EOSINOPHIL # BLD AUTO: 0 K/UL (ref 0–0.51)
EOSINOPHIL NFR BLD: 0 % (ref 0–6.9)
ERYTHROCYTE [DISTWIDTH] IN BLOOD BY AUTOMATED COUNT: 47.6 FL (ref 35.9–50)
GFR SERPL CREATININE-BSD FRML MDRD: >60 ML/MIN/1.73 M 2
GLOBULIN SER CALC-MCNC: 2.5 G/DL (ref 1.9–3.5)
GLUCOSE BLD-MCNC: 100 MG/DL (ref 65–99)
GLUCOSE BLD-MCNC: 60 MG/DL (ref 65–99)
GLUCOSE BLD-MCNC: 69 MG/DL (ref 65–99)
GLUCOSE BLD-MCNC: 78 MG/DL (ref 65–99)
GLUCOSE BLD-MCNC: 82 MG/DL (ref 65–99)
GLUCOSE SERPL-MCNC: 99 MG/DL (ref 65–99)
HCT VFR BLD AUTO: 32.1 % (ref 37–47)
HGB BLD-MCNC: 10.3 G/DL (ref 12–16)
LG PLATELETS BLD QL SMEAR: NORMAL
LIPASE SERPL-CCNC: <3 U/L (ref 11–82)
LYMPHOCYTES # BLD AUTO: 0.33 K/UL (ref 1–4.8)
LYMPHOCYTES NFR BLD: 5.2 % (ref 22–41)
MANUAL DIFF BLD: NORMAL
MCH RBC QN AUTO: 30.5 PG (ref 27–33)
MCHC RBC AUTO-ENTMCNC: 32.1 G/DL (ref 33.6–35)
MCV RBC AUTO: 95 FL (ref 81.4–97.8)
MONOCYTES # BLD AUTO: 0.11 K/UL (ref 0–0.85)
MONOCYTES NFR BLD AUTO: 1.7 % (ref 0–13.4)
MORPHOLOGY BLD-IMP: NORMAL
NEUTROPHILS # BLD AUTO: 5.96 K/UL (ref 2–7.15)
NEUTROPHILS NFR BLD: 83.5 % (ref 44–72)
NEUTS BAND NFR BLD MANUAL: 9.6 % (ref 0–10)
NRBC # BLD AUTO: 0 K/UL
NRBC BLD AUTO-RTO: 0 /100 WBC
OVALOCYTES BLD QL SMEAR: NORMAL
PLATELET # BLD AUTO: 201 K/UL (ref 164–446)
PLATELET BLD QL SMEAR: NORMAL
PMV BLD AUTO: 11.9 FL (ref 9–12.9)
POIKILOCYTOSIS BLD QL SMEAR: NORMAL
POTASSIUM SERPL-SCNC: 3.3 MMOL/L (ref 3.6–5.5)
PROT SERPL-MCNC: 4.7 G/DL (ref 6–8.2)
RBC # BLD AUTO: 3.38 M/UL (ref 4.2–5.4)
RBC BLD AUTO: PRESENT
SODIUM SERPL-SCNC: 140 MMOL/L (ref 135–145)
WBC # BLD AUTO: 6.4 K/UL (ref 4.8–10.8)

## 2017-03-21 PROCEDURE — 700111 HCHG RX REV CODE 636 W/ 250 OVERRIDE (IP)

## 2017-03-21 PROCEDURE — G8978 MOBILITY CURRENT STATUS: HCPCS | Mod: CJ

## 2017-03-21 PROCEDURE — 160002 HCHG RECOVERY MINUTES (STAT): Performed by: INTERNAL MEDICINE

## 2017-03-21 PROCEDURE — 700105 HCHG RX REV CODE 258

## 2017-03-21 PROCEDURE — 85007 BL SMEAR W/DIFF WBC COUNT: CPT

## 2017-03-21 PROCEDURE — 110371 HCHG SHELL REV 272: Performed by: INTERNAL MEDICINE

## 2017-03-21 PROCEDURE — G8988 SELF CARE GOAL STATUS: HCPCS | Mod: CI

## 2017-03-21 PROCEDURE — 110382 HCHG SHELL REV 271: Performed by: INTERNAL MEDICINE

## 2017-03-21 PROCEDURE — 82962 GLUCOSE BLOOD TEST: CPT

## 2017-03-21 PROCEDURE — G8987 SELF CARE CURRENT STATUS: HCPCS | Mod: CJ

## 2017-03-21 PROCEDURE — 97162 PT EVAL MOD COMPLEX 30 MIN: CPT

## 2017-03-21 PROCEDURE — 700101 HCHG RX REV CODE 250: Performed by: INTERNAL MEDICINE

## 2017-03-21 PROCEDURE — 700102 HCHG RX REV CODE 250 W/ 637 OVERRIDE(OP): Performed by: INTERNAL MEDICINE

## 2017-03-21 PROCEDURE — 160203 HCHG ENDO MINUTES - 1ST 30 MINS LEVEL 4: Performed by: INTERNAL MEDICINE

## 2017-03-21 PROCEDURE — 160208 HCHG ENDO MINUTES - EA ADDL 1 MIN LEVEL 4: Performed by: INTERNAL MEDICINE

## 2017-03-21 PROCEDURE — 502240 HCHG MISC OR SUPPLY RC 0272: Performed by: INTERNAL MEDICINE

## 2017-03-21 PROCEDURE — 85027 COMPLETE CBC AUTOMATED: CPT

## 2017-03-21 PROCEDURE — 160009 HCHG ANES TIME/MIN: Performed by: INTERNAL MEDICINE

## 2017-03-21 PROCEDURE — 97535 SELF CARE MNGMENT TRAINING: CPT

## 2017-03-21 PROCEDURE — 160048 HCHG OR STATISTICAL LEVEL 1-5: Performed by: INTERNAL MEDICINE

## 2017-03-21 PROCEDURE — 83690 ASSAY OF LIPASE: CPT

## 2017-03-21 PROCEDURE — 500066 HCHG BITE BLOCK, ECT: Performed by: INTERNAL MEDICINE

## 2017-03-21 PROCEDURE — 160036 HCHG PACU - EA ADDL 30 MINS PHASE I: Performed by: INTERNAL MEDICINE

## 2017-03-21 PROCEDURE — 80053 COMPREHEN METABOLIC PANEL: CPT

## 2017-03-21 PROCEDURE — 97166 OT EVAL MOD COMPLEX 45 MIN: CPT

## 2017-03-21 PROCEDURE — 700105 HCHG RX REV CODE 258: Performed by: INTERNAL MEDICINE

## 2017-03-21 PROCEDURE — A4606 OXYGEN PROBE USED W OXIMETER: HCPCS | Performed by: INTERNAL MEDICINE

## 2017-03-21 PROCEDURE — 770020 HCHG ROOM/CARE - TELE (206)

## 2017-03-21 PROCEDURE — 36415 COLL VENOUS BLD VENIPUNCTURE: CPT

## 2017-03-21 PROCEDURE — G8979 MOBILITY GOAL STATUS: HCPCS | Mod: CI

## 2017-03-21 PROCEDURE — A9270 NON-COVERED ITEM OR SERVICE: HCPCS | Performed by: INTERNAL MEDICINE

## 2017-03-21 PROCEDURE — 99233 SBSQ HOSP IP/OBS HIGH 50: CPT | Performed by: INTERNAL MEDICINE

## 2017-03-21 PROCEDURE — 74328 X-RAY BILE DUCT ENDOSCOPY: CPT

## 2017-03-21 PROCEDURE — 160035 HCHG PACU - 1ST 60 MINS PHASE I: Performed by: INTERNAL MEDICINE

## 2017-03-21 PROCEDURE — 700101 HCHG RX REV CODE 250

## 2017-03-21 PROCEDURE — 0FC98ZZ EXTIRPATION OF MATTER FROM COMMON BILE DUCT, VIA NATURAL OR ARTIFICIAL OPENING ENDOSCOPIC: ICD-10-PCS | Performed by: INTERNAL MEDICINE

## 2017-03-21 RX ADMIN — CEFEPIME 1 G: 1 INJECTION, POWDER, FOR SOLUTION INTRAMUSCULAR; INTRAVENOUS at 18:15

## 2017-03-21 RX ADMIN — SODIUM CHLORIDE: 9 INJECTION, SOLUTION INTRAVENOUS at 03:28

## 2017-03-21 RX ADMIN — SODIUM CHLORIDE: 9 INJECTION, SOLUTION INTRAVENOUS at 17:24

## 2017-03-21 RX ADMIN — METRONIDAZOLE 500 MG: 500 INJECTION, SOLUTION INTRAVENOUS at 00:35

## 2017-03-21 RX ADMIN — CEFEPIME 2 G: 2 INJECTION, POWDER, FOR SOLUTION INTRAVENOUS at 03:28

## 2017-03-21 RX ADMIN — FAMOTIDINE 20 MG: 20 TABLET, FILM COATED ORAL at 20:30

## 2017-03-21 RX ADMIN — METRONIDAZOLE 500 MG: 500 INJECTION, SOLUTION INTRAVENOUS at 09:55

## 2017-03-21 RX ADMIN — METRONIDAZOLE 500 MG: 500 INJECTION, SOLUTION INTRAVENOUS at 17:22

## 2017-03-21 ASSESSMENT — GAIT ASSESSMENTS
ASSISTIVE DEVICE: FRONT WHEEL WALKER
DISTANCE (FEET): 120
GAIT LEVEL OF ASSIST: MINIMAL ASSIST
DEVIATION: OTHER (COMMENT);DECREASED HEEL STRIKE;DECREASED TOE OFF

## 2017-03-21 ASSESSMENT — PATIENT HEALTH QUESTIONNAIRE - PHQ9
SUM OF ALL RESPONSES TO PHQ QUESTIONS 1-9: 0
SUM OF ALL RESPONSES TO PHQ9 QUESTIONS 1 AND 2: 0
2. FEELING DOWN, DEPRESSED, IRRITABLE, OR HOPELESS: NOT AT ALL
1. LITTLE INTEREST OR PLEASURE IN DOING THINGS: NOT AT ALL

## 2017-03-21 ASSESSMENT — ENCOUNTER SYMPTOMS
SEIZURES: 0
ORTHOPNEA: 0
FOCAL WEAKNESS: 0
HEADACHES: 0
DIARRHEA: 0
WEAKNESS: 1
BLURRED VISION: 0
SHORTNESS OF BREATH: 0
EYE DISCHARGE: 0
DIZZINESS: 0
ABDOMINAL PAIN: 1
PALPITATIONS: 0
VOMITING: 0
COUGH: 0
NERVOUS/ANXIOUS: 0
EYE REDNESS: 0
STRIDOR: 0
HEARTBURN: 0
DEPRESSION: 0
CHILLS: 0
SPUTUM PRODUCTION: 0
NAUSEA: 0
EYE PAIN: 0
WEIGHT LOSS: 0
FEVER: 0
BACK PAIN: 0
NECK PAIN: 0
MYALGIAS: 0
ABDOMINAL PAIN: 0
INSOMNIA: 0

## 2017-03-21 ASSESSMENT — PAIN SCALES - GENERAL
PAINLEVEL_OUTOF10: 0

## 2017-03-21 ASSESSMENT — ACTIVITIES OF DAILY LIVING (ADL): TOILETING: INDEPENDENT

## 2017-03-21 NOTE — CONSULTS
DATE OF SERVICE:  03/20/2017    REQUESTING PROVIDER:  Daniel Her MD.    REASON FOR CONSULTATION:  Choledocholithiasis.    HISTORY OF PRESENT ILLNESS:  An 82-year-old female who was admitted to the   hospital overnight after presenting with weakness and poor oral intake.  The   history is obtained primarily from the patient's daughter-in-law as well as   the chart.  The patient does provide some history.  Apparently, she lives   alone and has not been doing well over the last several weeks and several   months.  She has a history of oxygen-dependent COPD.  Recently, she has been   developing upper abdominal pain in conjunction with eating.  This has led to   anorexia and about a 15-pound weight loss.  Because of this, she was brought   to the emergency room and underwent evaluation.  Evaluation with CT scan   showed evidence of cholecystitis with a markedly distended gallbladder and   gallbladder wall thickening.  In addition, CT scan suggests a distal common   bile duct stone.  The patient has been admitted overnight, placed on   antibiotics and undergone a cholecystostomy tube.    PAST MEDICAL HISTORY:  1.  Oxygen-dependent COPD.  2.  Breast cancer status post mastectomy with chemotherapy.    PAST SURGICAL HISTORY:  Negative.    ALLERGIES:  None.    MEDICATIONS:  Prior to admission, oxygen, otherwise not taking any.    SOCIAL HISTORY:  She is .  She does not smoke, drink or use drugs.    REVIEW OF SYSTEMS:  Somewhat difficult to obtain, positive for 15-pound weight   loss, positive for anorexia, positive for abdominal pain, positive for   nausea.  No fevers or chills.  No chest pain, shortness of breath, dysphagia,   odynophagia, hematemesis, melena, hematochezia, dysuria, hematuria, or lower   extremity edema.    PHYSICAL EXAMINATION:  VITAL SIGNS:  Temperature 36.4, pulse 71, respirations 18-22, blood pressure   150/70, satting 100% on 2 liters.  GENERAL:  Pleasant elderly female in no acute  distress.  HEENT:  Pupils equal, round.  Extraocular movements are intact.  Arcus senilis   is present.  No scleral icterus.  NECK:  Supple, no adenopathy.  CHEST:  Clear to auscultation.  HEART:  Regular rate and rhythm, no murmur.  ABDOMEN:  Cholecystostomy tube in the right upper quadrant.  She is tender,   normoactive bowel sounds, nondistended.  No rebound.  EXTREMITIES:  Warm, dry, no edema.    LABORATORY DATA:  White count 19,000 down from 25,000, platelet count 227.    Sodium 140, potassium 3.6, BUN and creatinine of 29 and 0.86.  Liver tests are   normal including AST of 13, ALT of 12, alkaline phosphatase 99, total   bilirubin 1.  CT scan reviewed with radiology, indeed suggest a distal common   bile duct stone as well as the gallbladder findings as above    IMPRESSION:  An 82-year-old female who presents with anorexia and abdominal   pain and has evidence of cholecystitis status post cholecystostomy as well as   choledocholithiasis.  1.  Choledocholithiasis, indeed the CT scan suggests a distal common bile duct   stone with proximal dilation.  Her liver tests however are normal.  I   discussed the options for evaluation and management with the patient and her   family.  The options of MRCP or direct ERCP were reviewed.  The risks,   benefits, and alternatives to an ERCP were discussed with the patient and her   family.  The risks include but are not limited to cardiopulmonary   complications, bleeding, infection, perforation, pancreatitis, prolonged   hospitalization, and death.  The patient and her family seem to understand   these and they would like to take a few hours to make a decision.  If indeed   consent is given for ERCP then MRCP will be deferred and ERCP will be   performed under anesthesia.  In the meantime, antibiotics will be continued.    If the family chooses to defer ERCP then MRCP could be considered to fully   prove a distal common bile duct stone, but given the CT findings there  appears   indeed to be a common bile duct stone and therefore, MRCP is deferred for   now.  2.  Cholecystitis, status post cholecystostomy.  The patient has been placed   on antibiotics and an eventual cholecystectomy is planned in the coming weeks   by Dr. Polanco.  4.  Leukocytosis due to cholecystitis, which is improving on antibiotics.       ____________________________________     NEAL GUARDADO MD    TCC / NTS    DD:  03/20/2017 15:15:24  DT:  03/20/2017 19:35:26    D#:  [id number]  Job#:  [id number]    cc: Lucille Polanco MD

## 2017-03-21 NOTE — PROGRESS NOTES
"Surgical Progress Note    Author: Lucille Polanco Date & Time created: 3/21/2017   11:01 AM     Interval Events:  \"I feel about the same.\" Still having some pain around drain site. No N/V.    Review of Systems   Constitutional: Negative for fever and chills.   Gastrointestinal: Positive for abdominal pain. Negative for nausea and vomiting.     Hemodynamics:  Temp (24hrs), Av.2 °C (98.9 °F), Min:36.4 °C (97.6 °F), Max:38.2 °C (100.8 °F)  Temperature: 36.8 °C (98.3 °F)  Pulse  Av.8  Min: 52  Max: 117   Blood Pressure : 148/59 mmHg     Respiratory:    Respiration: 16, Pulse Oximetry: 91 %, O2 Daily Delivery Respiratory : Silicone Nasal Cannula     Work Of Breathing / Effort: Mild  RUL Breath Sounds: Clear, RML Breath Sounds: Diminished, RLL Breath Sounds: Diminished, JOSELYN Breath Sounds: Clear, LLL Breath Sounds: Diminished  Neuro:  GCS       Fluids:    Intake/Output Summary (Last 24 hours) at 17 1101  Last data filed at 17 0600   Gross per 24 hour   Intake      0 ml   Output    130 ml   Net   -130 ml     Weight: 37.9 kg (83 lb 8.9 oz)  Current Diet Order   Procedures   • DIET NPO     Physical Exam   Constitutional: She is oriented to person, place, and time. She appears well-developed and well-nourished. No distress.   HENT:   Head: Normocephalic.   Eyes: Conjunctivae are normal.   Neck: Normal range of motion.   Cardiovascular: Normal rate.    Pulmonary/Chest: Effort normal.   Abdominal: Soft. She exhibits no distension. There is tenderness.   Mild tenderness around drain site. Drain with bilious fluid.    Musculoskeletal: Normal range of motion.   Neurological: She is alert and oriented to person, place, and time.   Skin: Skin is warm and dry. She is not diaphoretic.   Psychiatric: She has a normal mood and affect. Her behavior is normal.     Labs:  Recent Results (from the past 24 hour(s))   ACCU-CHEK GLUCOSE    Collection Time: 17 12:03 PM   Result Value Ref Range    Glucose - Accu-Ck " 96 65 - 99 mg/dL   ACCU-CHEK GLUCOSE    Collection Time: 03/20/17  6:15 PM   Result Value Ref Range    Glucose - Accu-Ck 77 65 - 99 mg/dL   ACCU-CHEK GLUCOSE    Collection Time: 03/20/17  8:42 PM   Result Value Ref Range    Glucose - Accu-Ck 111 (H) 65 - 99 mg/dL   CBC WITH DIFFERENTIAL    Collection Time: 03/21/17  1:13 AM   Result Value Ref Range    WBC 6.4 4.8 - 10.8 K/uL    RBC 3.38 (L) 4.20 - 5.40 M/uL    Hemoglobin 10.3 (L) 12.0 - 16.0 g/dL    Hematocrit 32.1 (L) 37.0 - 47.0 %    MCV 95.0 81.4 - 97.8 fL    MCH 30.5 27.0 - 33.0 pg    MCHC 32.1 (L) 33.6 - 35.0 g/dL    RDW 47.6 35.9 - 50.0 fL    Platelet Count 201 164 - 446 K/uL    MPV 11.9 9.0 - 12.9 fL    Nucleated RBC 0.00 /100 WBC    NRBC (Absolute) 0.00 K/uL    Neutrophils-Polys 83.50 (H) 44.00 - 72.00 %    Lymphocytes 5.20 (L) 22.00 - 41.00 %    Monocytes 1.70 0.00 - 13.40 %    Eosinophils 0.00 0.00 - 6.90 %    Basophils 0.00 0.00 - 1.80 %    Neutrophils (Absolute) 5.96 2.00 - 7.15 K/uL    Lymphs (Absolute) 0.33 (L) 1.00 - 4.80 K/uL    Monos (Absolute) 0.11 0.00 - 0.85 K/uL    Eos (Absolute) 0.00 0.00 - 0.51 K/uL    Baso (Absolute) 0.00 0.00 - 0.12 K/uL   COMP METABOLIC PANEL    Collection Time: 03/21/17  1:13 AM   Result Value Ref Range    Sodium 140 135 - 145 mmol/L    Potassium 3.3 (L) 3.6 - 5.5 mmol/L    Chloride 109 96 - 112 mmol/L    Co2 22 20 - 33 mmol/L    Anion Gap 9.0 0.0 - 11.9    Glucose 99 65 - 99 mg/dL    Bun 25 (H) 8 - 22 mg/dL    Creatinine 0.69 0.50 - 1.40 mg/dL    Calcium 7.4 (L) 8.5 - 10.5 mg/dL    AST(SGOT) 13 12 - 45 U/L    ALT(SGPT) 10 2 - 50 U/L    Alkaline Phosphatase 71 30 - 99 U/L    Total Bilirubin 0.9 0.1 - 1.5 mg/dL    Albumin 2.2 (L) 3.2 - 4.9 g/dL    Total Protein 4.7 (L) 6.0 - 8.2 g/dL    Globulin 2.5 1.9 - 3.5 g/dL    A-G Ratio 0.9 g/dL   LIPASE    Collection Time: 03/21/17  1:13 AM   Result Value Ref Range    Lipase <3 (L) 11 - 82 U/L   DIFFERENTIAL MANUAL    Collection Time: 03/21/17  1:13 AM   Result Value Ref Range     Bands-Stabs 9.60 0.00 - 10.00 %    Manual Diff Status PERFORMED    PERIPHERAL SMEAR REVIEW    Collection Time: 03/21/17  1:13 AM   Result Value Ref Range    Peripheral Smear Review see below    PLATELET ESTIMATE    Collection Time: 03/21/17  1:13 AM   Result Value Ref Range    Plt Estimation Normal    MORPHOLOGY    Collection Time: 03/21/17  1:13 AM   Result Value Ref Range    RBC Morphology Present     Large Platelets 1+     Poikilocytosis 1+     Ovalocytes 1+    ESTIMATED GFR    Collection Time: 03/21/17  1:13 AM   Result Value Ref Range    GFR If African American >60 >60 mL/min/1.73 m 2    GFR If Non African American >60 >60 mL/min/1.73 m 2   ACCU-CHEK GLUCOSE    Collection Time: 03/21/17  5:58 AM   Result Value Ref Range    Glucose - Accu-Ck 78 65 - 99 mg/dL     Medical Decision Making, by Problem:  There are no hospital problems to display for this patient.    Plan:  Leukocytosis resolved. Afebrile.   Less abdominal pain on palpation today.   ERCP today. Diet afterwards per GI, ok to advance as tolerated from my standpoint.   Encouraged to participate with physical therapy. Patient currently resistant to any recommendation for SNF after discharge.     Quality Measures:  Labs reviewed  Solis catheter: No Solis                    Discussed patient condition with Family, RN, Patient and hospitalist

## 2017-03-21 NOTE — THERAPY
"Occupational Therapy Evaluation completed.   Functional Status:  Pt up in chair at start of session.  Min A sit to stand with prompts for hand placement.  Pt walked in hallway using FWW and CGA.  Pt declined grooming 2' to fatigue.  Pt adamant about going home however pt's son expressed concerns with pt's ability to care for herself.  Plan of Care: Will benefit from Occupational Therapy 3 times per week  Discharge Recommendations:  Equipment: Front-Wheel Walker, Shower Chair and Will Continue to Assess for Equipment Needs. Post-acute therapy Discharge to a transitional care facility for continued skilled therapy services.    See \"Rehab Therapy-Acute\" Patient Summary Report for complete documentation.    "

## 2017-03-21 NOTE — DIETARY
Nutrition: Day 2 of admit.  83 yo female admitted from [facility] with Sepsis and Acute cholecystitis.  Started on clear liquid diet yesterday and now NPO for ERCP.  BMI 16.87 and albumin 3.4 on admit. Pt with history of COPD, anemia, B12 deficiency and mild anemia.  Pt appears malnourished.  Increased nutrition needs secondary to advanced age and hospitalization with increased risk for decompensation without adequate nutrition.     Recommendations/Plan:  1) restart diet post procedure and consider ordering supplements to better meet nutritional needs  2) monitor daily weights for adequacy of nutrition and fluid balance  3) document intake of all meals and supplements as % taken in ADL's to provide interdisciplinary communication across all shifts

## 2017-03-21 NOTE — OR NURSING
PATIENT VERY SLOW  TO AWAKEN FROM ANESTHSIA.  SPONT MOVEMENT BILAT EXT. BUT SLOW TO ANSWER SIMPLE QUESTIONS.  CALLED THE FLOOR RN MERLE AND SHE STATED PATIENT WAS A+OX3 PRIOR TO SURGERY. CONT TO MONITOR FOR INCREASING LOC. DR URENA UPDATED

## 2017-03-21 NOTE — PROGRESS NOTES
Bedside report received. POC discussed with pt; all questions answered at this time. Call light and personal belongings within reach. Proper fall precautions in place.

## 2017-03-21 NOTE — CARE PLAN
Problem: Knowledge Deficit  Goal: Knowledge of disease process/condition, treatment plan, diagnostic tests, and medications will improve  Outcome: PROGRESSING AS EXPECTED  Patient educated regarding medications, procedures and plan of care.

## 2017-03-21 NOTE — THERAPY
"Physical Therapy Evaluation completed.   Bed Mobility:  Supine to Sit:  (found sitting)  Transfers: Sit to Stand: Contact Guard Assist  Gait: Level Of Assist: Minimal Assist with Front-Wheel Walker       Plan of Care: Will benefit from Physical Therapy 3 times per week  Discharge Recommendations: Equipment: Front-Wheel Walker, Tub Transfer Bench and Will Continue to Assess for Equipment Needs. See below    Pt presents to PT for risk reduction for LOB and falling. Pt was able to demonstrate short, hallway distance ambulation with FWW with no adry LOB with occasional min A. Her cognitive deficits are exacerbating her functional impairements and balance and pt appears to have decreased insight into deficits and affect on function and safety. Given pt and families subjetive reports and current objective findings, unclear as to pt's ability to effectively care for self with ADLs and pt appears to be a high fall risk without at least close SBA when ambulatory at this time. Pt needs reinforcement of compensatory strategies for risk reduction for LOB/falling (gait mechanics and use of FWW/AD when ambulatory). Pt will benefit from continued skilled acute PT while here with recommendation of continued skilled PT/placement prior to medical d/c to home enviornment. Pt may need additional assist/services upon eventual d/c to home if current cognition is baseline. Will continue to visit.     See \"Rehab Therapy-Acute\" Patient Summary Report for complete documentation.     "

## 2017-03-21 NOTE — OR NURSING
DR DINERO AT BEDSIDE TO ASSESS NEURO STATUS. PATIENT ABLE TO ANSWER ALL HIS QUESTIONS APPROPRIATELY. THOMAS BALLARD.  REPORT CALLED TO FLOOR WALTER BLOOM.  AWAITING TRANSFER

## 2017-03-21 NOTE — PROGRESS NOTES
Hospital Medicine Progress Note, Adult, Complex               Author: Obed Schaffer Date & Time created: 3/21/2017  8:49 AM     CC: abdominal pain    Interval History:  81 y/o F with PMH of COPD on 2 L of home o2, memory loss(dementia?), DLD: admitted for above.    Had a detailed discussion with the patient and family regarding her conditions and answered all the questions they had. Per SON who is POA, he wanted her to be in SNF upon discharge since she lives alone.    Review of Systems:  Review of Systems   Constitutional: Positive for malaise/fatigue. Negative for fever, chills and weight loss.   HENT: Negative for congestion and nosebleeds.    Eyes: Negative for blurred vision, pain, discharge and redness.   Respiratory: Negative for cough, sputum production, shortness of breath and stridor.    Cardiovascular: Negative for chest pain, palpitations and orthopnea.   Gastrointestinal: Negative for heartburn, nausea, vomiting, abdominal pain and diarrhea.   Genitourinary: Negative for dysuria, urgency and frequency.   Musculoskeletal: Negative for myalgias, back pain and neck pain.   Skin: Negative for itching and rash.   Neurological: Positive for weakness. Negative for dizziness, focal weakness, seizures and headaches.   Psychiatric/Behavioral: Negative for depression. The patient is not nervous/anxious and does not have insomnia.        Physical Exam:  Physical Exam   Constitutional: She is oriented to person, place, and time. No distress.   HENT:   Head: Normocephalic and atraumatic.   Mouth/Throat: Oropharynx is clear and moist.   Eyes: Conjunctivae and EOM are normal. Pupils are equal, round, and reactive to light.   Neck: Normal range of motion. Neck supple. No tracheal deviation present. No thyromegaly present.   Cardiovascular: Normal rate and regular rhythm.    No murmur heard.  Pulmonary/Chest: Effort normal and breath sounds normal. No respiratory distress. She has no wheezes.   Abdominal: Soft. Bowel sounds  are normal. She exhibits no distension. There is no tenderness.   Musculoskeletal: She exhibits no edema or tenderness.   Neurological: She is alert and oriented to person, place, and time. No cranial nerve deficit.   Skin: Skin is warm and dry. She is not diaphoretic. No erythema.   Psychiatric: She has a normal mood and affect. Her behavior is normal. Thought content normal.       Labs:  Recent Labs      17   1440  17   1710  17   ISTATSPEC  Venous  Venous  Venous     Recent Labs      17   1440   TROPONINI  <0.02     Recent Labs      17   1440  17   0119  17   0113   SODIUM  138  140  140   POTASSIUM  4.3  3.6  3.3*   CHLORIDE  96  107  109   CO2  29  25  22   BUN  29*  29*  25*   CREATININE  1.20  0.86  0.69   PHOSPHORUS   --   3.0   --    CALCIUM  9.1  8.3*  7.4*     Recent Labs      17   1440  17   0119  17   0113   ALTSGPT  18  12  10   ASTSGOT  23  13  13   ALKPHOSPHAT  131*  99  71   TBILIRUBIN  1.5  1.0  0.9   LIPASE  15   --   <3*   GLUCOSE  146*  115*  99     Recent Labs      17   1440  17   0119  17   0113   RBC  4.41  3.70*  3.38*   HEMOGLOBIN  13.7  11.4*  10.3*   HEMATOCRIT  42.2  34.8*  32.1*   PLATELETCT  306  227  201   PROTHROMBTM  17.5*   --    --    APTT  26.8   --    --    INR  1.46*   --    --      Recent Labs      17   1440  17   0119  17   0113   WBC  25.4*  19.5*  6.4   NEUTSPOLYS  88.60*  87.70*  83.50*   LYMPHOCYTES  2.40*  9.60*  5.20*   MONOCYTES  8.20  1.80  1.70   EOSINOPHILS  0.00  0.00  0.00   BASOPHILS  0.20  0.00  0.00   ASTSGOT  23  13  13   ALTSGPT  18  12  10   ALKPHOSPHAT  131*  99  71   TBILIRUBIN  1.5  1.0  0.9           Hemodynamics:  Temp (24hrs), Av.2 °C (98.9 °F), Min:36.4 °C (97.6 °F), Max:38.2 °C (100.8 °F)  Temperature: 36.8 °C (98.3 °F)  Pulse  Av.8  Min: 52  Max: 117   Blood Pressure : 148/59 mmHg     Respiratory:    Respiration: 16, Pulse Oximetry: 91 %,  O2 Daily Delivery Respiratory : Silicone Nasal Cannula     Work Of Breathing / Effort: Mild  RUL Breath Sounds: Clear, RML Breath Sounds: Diminished, RLL Breath Sounds: Diminished, JOSELYN Breath Sounds: Clear, LLL Breath Sounds: Diminished  Fluids:    Intake/Output Summary (Last 24 hours) at 03/21/17 0849  Last data filed at 03/21/17 0600   Gross per 24 hour   Intake      0 ml   Output    130 ml   Net   -130 ml     Weight: 37.9 kg (83 lb 8.9 oz)  GI/Nutrition:  Orders Placed This Encounter   Procedures   • DIET NPO     Standing Status: Standing      Number of Occurrences: 8      Standing Expiration Date:      Order Specific Question:  Restrict to:     Answer:  Strict [1]     Medical Decision Making, by Problem:    Acute cholecystitis: post cholecystotomy  - surgery following  - ultrasound cholelithiasis   - ERCP today    Leucocytosis:  - improving  - continue abx with cefepime and metronidazole    Memory loss: age related dementia?  - son is POA and wanted her to be in SNF upon discharge  - PT/OT    Hypokalemia:  - worsening  - replete as needed  - follow cmp in am    Normocytic anemia  - stable      Labs reviewed, Medications reviewed and Radiology images reviewed        DVT Prophylaxis: Heparin      Antibiotics: Treating active infection/contamination beyond 24 hours perioperative coverage

## 2017-03-21 NOTE — CARE PLAN
Problem: Safety  Goal: Will remain free from falls  Outcome: PROGRESSING AS EXPECTED  Patient educated about risk of falls and reasoning for use of tread socks, calling for help, and bed alarms.

## 2017-03-21 NOTE — PROCEDURES
GI    ERCP  -periampullary diverticulum  -dilated biliary tree  -large stone initially in CBD then migrated to left IHD  -stone removal with balloon after sphincterotomy       Plan  -full liquids  -eventual cholecystectomy  -antibiotics

## 2017-03-21 NOTE — CARE PLAN
Problem: Nutritional:  Goal: Achieve adequate nutritional intake  Patient will consume 50% of meals  Outcome: NOT MET  - restart diet  - document intake of all meals and supplements as % taken in ADL's to provide interdisciplinary communication across all shifts

## 2017-03-21 NOTE — PROGRESS NOTES
Iv site lac leaking at insertion site. Discontinued with cathalon intact. Attached running iv fluids to iv site rt hand. It also began leaking at the insertion site. Removed this as well.  #22 rfa inserted after 2 attempts and iv fluids infusing.

## 2017-03-22 LAB
ALBUMIN SERPL BCP-MCNC: 2.4 G/DL (ref 3.2–4.9)
ALBUMIN/GLOB SERPL: 0.9 G/DL
ALP SERPL-CCNC: 75 U/L (ref 30–99)
ALT SERPL-CCNC: 9 U/L (ref 2–50)
ANION GAP SERPL CALC-SCNC: 8 MMOL/L (ref 0–11.9)
ANISOCYTOSIS BLD QL SMEAR: ABNORMAL
AST SERPL-CCNC: 13 U/L (ref 12–45)
BACTERIA WND AEROBE CULT: NORMAL
BASOPHILS # BLD AUTO: 0 % (ref 0–1.8)
BASOPHILS # BLD: 0 K/UL (ref 0–0.12)
BILIRUB SERPL-MCNC: 0.7 MG/DL (ref 0.1–1.5)
BUN SERPL-MCNC: 28 MG/DL (ref 8–22)
BURR CELLS BLD QL SMEAR: NORMAL
CALCIUM SERPL-MCNC: 8 MG/DL (ref 8.5–10.5)
CHLORIDE SERPL-SCNC: 112 MMOL/L (ref 96–112)
CO2 SERPL-SCNC: 19 MMOL/L (ref 20–33)
CREAT SERPL-MCNC: 0.63 MG/DL (ref 0.5–1.4)
DACRYOCYTES BLD QL SMEAR: NORMAL
EOSINOPHIL # BLD AUTO: 0 K/UL (ref 0–0.51)
EOSINOPHIL NFR BLD: 0 % (ref 0–6.9)
ERYTHROCYTE [DISTWIDTH] IN BLOOD BY AUTOMATED COUNT: 50.1 FL (ref 35.9–50)
GFR SERPL CREATININE-BSD FRML MDRD: >60 ML/MIN/1.73 M 2
GLOBULIN SER CALC-MCNC: 2.6 G/DL (ref 1.9–3.5)
GLUCOSE BLD-MCNC: 133 MG/DL (ref 65–99)
GLUCOSE BLD-MCNC: 173 MG/DL (ref 65–99)
GLUCOSE BLD-MCNC: 80 MG/DL (ref 65–99)
GLUCOSE SERPL-MCNC: 82 MG/DL (ref 65–99)
GRAM STN SPEC: NORMAL
HCT VFR BLD AUTO: 35.9 % (ref 37–47)
HGB BLD-MCNC: 11 G/DL (ref 12–16)
LYMPHOCYTES # BLD AUTO: 0.56 K/UL (ref 1–4.8)
LYMPHOCYTES NFR BLD: 6.2 % (ref 22–41)
MACROCYTES BLD QL SMEAR: ABNORMAL
MAGNESIUM SERPL-MCNC: 1.7 MG/DL (ref 1.5–2.5)
MANUAL DIFF BLD: NORMAL
MCH RBC QN AUTO: 29.6 PG (ref 27–33)
MCHC RBC AUTO-ENTMCNC: 30.6 G/DL (ref 33.6–35)
MCV RBC AUTO: 96.8 FL (ref 81.4–97.8)
MONOCYTES # BLD AUTO: 0.65 K/UL (ref 0–0.85)
MONOCYTES NFR BLD AUTO: 7.1 % (ref 0–13.4)
MORPHOLOGY BLD-IMP: NORMAL
NEUTROPHILS # BLD AUTO: 7.89 K/UL (ref 2–7.15)
NEUTROPHILS NFR BLD: 83.2 % (ref 44–72)
NEUTS BAND NFR BLD MANUAL: 3.5 % (ref 0–10)
NRBC # BLD AUTO: 0 K/UL
NRBC BLD AUTO-RTO: 0 /100 WBC
PLATELET # BLD AUTO: 253 K/UL (ref 164–446)
PLATELET BLD QL SMEAR: NORMAL
PMV BLD AUTO: 11.3 FL (ref 9–12.9)
POIKILOCYTOSIS BLD QL SMEAR: NORMAL
POTASSIUM SERPL-SCNC: 3.6 MMOL/L (ref 3.6–5.5)
PROT SERPL-MCNC: 5 G/DL (ref 6–8.2)
RBC # BLD AUTO: 3.71 M/UL (ref 4.2–5.4)
RBC BLD AUTO: PRESENT
SIGNIFICANT IND 70042: NORMAL
SITE SITE: NORMAL
SODIUM SERPL-SCNC: 139 MMOL/L (ref 135–145)
SOURCE SOURCE: NORMAL
WBC # BLD AUTO: 9.1 K/UL (ref 4.8–10.8)

## 2017-03-22 PROCEDURE — 700101 HCHG RX REV CODE 250

## 2017-03-22 PROCEDURE — 700111 HCHG RX REV CODE 636 W/ 250 OVERRIDE (IP)

## 2017-03-22 PROCEDURE — 700102 HCHG RX REV CODE 250 W/ 637 OVERRIDE(OP): Performed by: INTERNAL MEDICINE

## 2017-03-22 PROCEDURE — 700101 HCHG RX REV CODE 250: Performed by: INTERNAL MEDICINE

## 2017-03-22 PROCEDURE — 82962 GLUCOSE BLOOD TEST: CPT

## 2017-03-22 PROCEDURE — 80053 COMPREHEN METABOLIC PANEL: CPT

## 2017-03-22 PROCEDURE — 700105 HCHG RX REV CODE 258: Performed by: INTERNAL MEDICINE

## 2017-03-22 PROCEDURE — A9270 NON-COVERED ITEM OR SERVICE: HCPCS | Performed by: INTERNAL MEDICINE

## 2017-03-22 PROCEDURE — 700105 HCHG RX REV CODE 258

## 2017-03-22 PROCEDURE — 85027 COMPLETE CBC AUTOMATED: CPT

## 2017-03-22 PROCEDURE — 83735 ASSAY OF MAGNESIUM: CPT

## 2017-03-22 PROCEDURE — 770020 HCHG ROOM/CARE - TELE (206)

## 2017-03-22 PROCEDURE — 93005 ELECTROCARDIOGRAM TRACING: CPT | Performed by: INTERNAL MEDICINE

## 2017-03-22 PROCEDURE — 85007 BL SMEAR W/DIFF WBC COUNT: CPT

## 2017-03-22 PROCEDURE — 99233 SBSQ HOSP IP/OBS HIGH 50: CPT | Performed by: INTERNAL MEDICINE

## 2017-03-22 PROCEDURE — 93010 ELECTROCARDIOGRAM REPORT: CPT | Mod: 76 | Performed by: INTERNAL MEDICINE

## 2017-03-22 PROCEDURE — 36415 COLL VENOUS BLD VENIPUNCTURE: CPT

## 2017-03-22 PROCEDURE — 700111 HCHG RX REV CODE 636 W/ 250 OVERRIDE (IP): Performed by: INTERNAL MEDICINE

## 2017-03-22 RX ORDER — METRONIDAZOLE 500 MG/1
500 TABLET ORAL EVERY 8 HOURS
Status: DISCONTINUED | OUTPATIENT
Start: 2017-03-22 | End: 2017-03-24 | Stop reason: HOSPADM

## 2017-03-22 RX ORDER — DILTIAZEM HYDROCHLORIDE 5 MG/ML
10 INJECTION INTRAVENOUS ONCE
Status: COMPLETED | OUTPATIENT
Start: 2017-03-22 | End: 2017-03-22

## 2017-03-22 RX ORDER — POTASSIUM CHLORIDE 20 MEQ/1
40 TABLET, EXTENDED RELEASE ORAL ONCE
Status: COMPLETED | OUTPATIENT
Start: 2017-03-22 | End: 2017-03-22

## 2017-03-22 RX ORDER — SODIUM CHLORIDE 9 MG/ML
INJECTION, SOLUTION INTRAVENOUS
Status: COMPLETED
Start: 2017-03-22 | End: 2017-03-22

## 2017-03-22 RX ORDER — CIPROFLOXACIN 500 MG/1
500 TABLET, FILM COATED ORAL EVERY 12 HOURS
Status: DISCONTINUED | OUTPATIENT
Start: 2017-03-22 | End: 2017-03-24 | Stop reason: HOSPADM

## 2017-03-22 RX ORDER — MAGNESIUM SULFATE HEPTAHYDRATE 40 MG/ML
2 INJECTION, SOLUTION INTRAVENOUS ONCE
Status: COMPLETED | OUTPATIENT
Start: 2017-03-22 | End: 2017-03-22

## 2017-03-22 RX ORDER — DIGOXIN 125 MCG
250 TABLET ORAL DAILY
Status: DISCONTINUED | OUTPATIENT
Start: 2017-03-22 | End: 2017-03-22

## 2017-03-22 RX ORDER — DIGOXIN 125 MCG
250 TABLET ORAL DAILY
Status: DISCONTINUED | OUTPATIENT
Start: 2017-03-22 | End: 2017-03-23

## 2017-03-22 RX ADMIN — ROSUVASTATIN CALCIUM 250 MCG: 10 TABLET, FILM COATED ORAL at 20:26

## 2017-03-22 RX ADMIN — MAGNESIUM SULFATE IN WATER 2 G: 40 INJECTION, SOLUTION INTRAVENOUS at 20:27

## 2017-03-22 RX ADMIN — SODIUM CHLORIDE: 9 INJECTION, SOLUTION INTRAVENOUS at 02:00

## 2017-03-22 RX ADMIN — POTASSIUM CHLORIDE 40 MEQ: 1500 TABLET, EXTENDED RELEASE ORAL at 15:46

## 2017-03-22 RX ADMIN — MORPHINE SULFATE 2 MG: 4 INJECTION INTRAVENOUS at 21:57

## 2017-03-22 RX ADMIN — METRONIDAZOLE 500 MG: 500 INJECTION, SOLUTION INTRAVENOUS at 01:45

## 2017-03-22 RX ADMIN — SODIUM CHLORIDE: 9 INJECTION, SOLUTION INTRAVENOUS at 23:46

## 2017-03-22 RX ADMIN — DILTIAZEM HYDROCHLORIDE 60 MG: 30 TABLET, FILM COATED ORAL at 23:46

## 2017-03-22 RX ADMIN — SODIUM CHLORIDE: 9 INJECTION, SOLUTION INTRAVENOUS at 13:16

## 2017-03-22 RX ADMIN — DILTIAZEM HYDROCHLORIDE 10 MG: 5 INJECTION INTRAVENOUS at 15:26

## 2017-03-22 RX ADMIN — FAMOTIDINE 20 MG: 20 TABLET, FILM COATED ORAL at 08:05

## 2017-03-22 RX ADMIN — METRONIDAZOLE 500 MG: 500 TABLET ORAL at 15:45

## 2017-03-22 RX ADMIN — DILTIAZEM HYDROCHLORIDE 10 MG: 5 INJECTION INTRAVENOUS at 16:24

## 2017-03-22 RX ADMIN — METRONIDAZOLE 500 MG: 500 TABLET ORAL at 20:27

## 2017-03-22 RX ADMIN — METRONIDAZOLE 500 MG: 500 INJECTION, SOLUTION INTRAVENOUS at 08:05

## 2017-03-22 RX ADMIN — LORAZEPAM 0.5 MG: 1 TABLET ORAL at 21:58

## 2017-03-22 RX ADMIN — METRONIDAZOLE 500 MG: 500 INJECTION, SOLUTION INTRAVENOUS at 01:40

## 2017-03-22 RX ADMIN — DILTIAZEM HYDROCHLORIDE 60 MG: 30 TABLET, FILM COATED ORAL at 18:26

## 2017-03-22 RX ADMIN — CIPROFLOXACIN HYDROCHLORIDE 500 MG: 500 TABLET, FILM COATED ORAL at 20:27

## 2017-03-22 RX ADMIN — CEFEPIME 1 G: 1 INJECTION, POWDER, FOR SOLUTION INTRAMUSCULAR; INTRAVENOUS at 03:46

## 2017-03-22 ASSESSMENT — PAIN SCALES - GENERAL
PAINLEVEL_OUTOF10: 0
PAINLEVEL_OUTOF10: 8

## 2017-03-22 ASSESSMENT — ENCOUNTER SYMPTOMS
CHILLS: 0
FEVER: 0
VOMITING: 0
NAUSEA: 0
GASTROINTESTINAL NEGATIVE: 1

## 2017-03-22 ASSESSMENT — COPD QUESTIONNAIRES: DURING THE PAST 4 WEEKS HOW MUCH DID YOU FEEL SHORT OF BREATH: NONE/LITTLE OF THE TIME

## 2017-03-22 ASSESSMENT — LIFESTYLE VARIABLES: DO YOU DRINK ALCOHOL: NO

## 2017-03-22 NOTE — PROGRESS NOTES
Bedside report received from RN. POC discussed with pt; all questions answered at this time. White board updated. Appropriate functioning of tele monitor confirmed. All needs met at this time.

## 2017-03-22 NOTE — PSYCHIATRY
Full note to follow.     Pt has some clear memory issues and cognitive problems. Also has some mood lability. Pt does not have capacity to make medical decisions.

## 2017-03-22 NOTE — PROGRESS NOTES
Hospital Medicine Progress Note, Adult, Complex               Author: Obed JANUSZ Sarbjit Date & Time created: 3/22/2017  8:06 AM     CC: abdominal pain    Interval History:  81 y/o F with PMH of COPD on 2 L of home o2, memory loss(dementia?), DLD: admitted for above.    3/23: Complaint about abdominal pain. Discussed with the son Dwight(POA) about psych evaluation for capacity. He said she has been having memory issue over the past several years, but never been diagnosed with dementia.  Later this afternoon, patient was found to have afib with RVR. Gave diltiazem 10 injection 2 doses and able to bring rate<100. Started on digoxin and diltiazam po. Explained to the son Km in detail about Afib and AC, including risk and benefits. He will let us know about the decision later.    Review of Systems:  Review of Systems   Constitutional: Positive for malaise/fatigue. Negative for fever, chills and weight loss.   HENT: Negative for congestion and nosebleeds.    Eyes: Negative for blurred vision, pain, discharge and redness.   Respiratory: Negative for cough, sputum production and shortness of breath.    Cardiovascular: Negative for chest pain, palpitations and orthopnea.   Gastrointestinal: Positive for abdominal pain. Negative for heartburn, nausea and vomiting.   Genitourinary: Negative for dysuria, urgency and frequency.   Musculoskeletal: Negative for myalgias, back pain and neck pain.   Skin: Negative for itching and rash.   Neurological: Positive for weakness. Negative for dizziness, focal weakness, seizures and headaches.   Psychiatric/Behavioral: Negative for depression. The patient is not nervous/anxious and does not have insomnia.        Physical Exam:  Physical Exam   Constitutional: She is oriented to person, place, and time. No distress.   HENT:   Head: Normocephalic and atraumatic.   Mouth/Throat: Oropharynx is clear and moist.   Eyes: Conjunctivae and EOM are normal. Pupils are equal, round, and reactive to  light.   Neck: Normal range of motion. Neck supple. No thyromegaly present.   Cardiovascular:   No murmur heard.  IRIR   Pulmonary/Chest: Effort normal and breath sounds normal. No respiratory distress. She has no wheezes.   Abdominal: Soft. Bowel sounds are normal. She exhibits no distension. There is no tenderness. There is no rebound.   Musculoskeletal: She exhibits no edema or tenderness.   Neurological: She is alert and oriented to person, place, and time. No cranial nerve deficit.   Skin: Skin is warm and dry. She is not diaphoretic. No erythema.   Psychiatric: She has a normal mood and affect. Her behavior is normal. Thought content normal.       Labs:  Recent Labs      03/19/17   1440  03/19/17   1710  03/19/17 2037   ISTATSPEC  Venous  Venous  Venous     Recent Labs      03/19/17   1440   TROPONINI  <0.02     Recent Labs      03/20/17   0119 03/21/17   0113  03/22/17   0150   SODIUM  140  140  139   POTASSIUM  3.6  3.3*  3.6   CHLORIDE  107  109  112   CO2  25  22  19*   BUN  29*  25*  28*   CREATININE  0.86  0.69  0.63   PHOSPHORUS  3.0   --    --    CALCIUM  8.3*  7.4*  8.0*     Recent Labs      03/19/17   1440  03/20/17   0119 03/21/17   0113  03/22/17   0150   ALTSGPT  18  12  10  9   ASTSGOT  23  13  13  13   ALKPHOSPHAT  131*  99  71  75   TBILIRUBIN  1.5  1.0  0.9  0.7   LIPASE  15   --   <3*   --    GLUCOSE  146*  115*  99  82     Recent Labs      03/19/17   1440  03/20/17   0119 03/21/17   0113  03/22/17   0150   RBC  4.41  3.70*  3.38*  3.71*   HEMOGLOBIN  13.7  11.4*  10.3*  11.0*   HEMATOCRIT  42.2  34.8*  32.1*  35.9*   PLATELETCT  306  227  201  253   PROTHROMBTM  17.5*   --    --    --    APTT  26.8   --    --    --    INR  1.46*   --    --    --      Recent Labs      03/20/17   0119 03/21/17   0113  03/22/17   0150   WBC  19.5*  6.4  9.1   NEUTSPOLYS  87.70*  83.50*  83.20*   LYMPHOCYTES  9.60*  5.20*  6.20*   MONOCYTES  1.80  1.70  7.10   EOSINOPHILS  0.00  0.00  0.00   BASOPHILS   0.00  0.00  0.00   ASTSGOT  13  13  13   ALTSGPT  12  10  9   ALKPHOSPHAT  99  71  75   TBILIRUBIN  1.0  0.9  0.7           Hemodynamics:  Temp (24hrs), Av.7 °C (98 °F), Min:36.1 °C (97 °F), Max:36.9 °C (98.4 °F)  Temperature: 36.4 °C (97.6 °F)  Pulse  Av.2  Min: 52  Max: 117Heart Rate (Monitored): 86  Blood Pressure : 144/64 mmHg, NIBP: 119/93 mmHg     Respiratory:    Respiration: 16, Pulse Oximetry: 99 %     Work Of Breathing / Effort: Mild  RUL Breath Sounds: Clear, RML Breath Sounds: Clear, RLL Breath Sounds: Diminished, JOSELYN Breath Sounds: Clear, LLL Breath Sounds: Diminished  Fluids:    Intake/Output Summary (Last 24 hours) at 17 0806  Last data filed at 17 0400   Gross per 24 hour   Intake   1000 ml   Output    180 ml   Net    820 ml     Weight: 45.7 kg (100 lb 12 oz)  GI/Nutrition:  Orders Placed This Encounter   Procedures   • DIET ORDER     Standing Status: Standing      Number of Occurrences: 1      Standing Expiration Date:      Order Specific Question:  Diet:     Answer:  Full Liquid [11]     Medical Decision Making, by Problem:    New onset Afib with RVR(paroxysmal)  - back to sinus after 2 doses of 10 mg diltiazem injection  - started on PO diltiazem and digoxin 250 mcg  - echo: pending  - will consult cardiology     Acute cholecystitis: post cholecystotomy  - surgery following  - ultrasound cholelithiasis   - ERCP 3/20  1.  Periampullary diverticulum.  2.  Dilated biliary tree.  3.  Large stone in the distal common bile duct which migrated proximally into    the left intrahepatic duct.  4.  Successful sphincterotomy.  5.  Successful stone extraction using balloon.  - continue abx    Leucocytosis:  - improving  - on PO cipro and metronidazole    Memory loss: age related dementia?  - son is POA and wanted her to be in SNF upon discharge  - PT/OT  - per psych: she doesn't have capacity to make medical decision    Hypokalemia:  - improving  - replete as needed  - follow cmp in  am    Normocytic anemia  - stable      Labs reviewed, Medications reviewed and Radiology images reviewed        DVT Prophylaxis: Heparin      Antibiotics: Treating active infection/contamination beyond 24 hours perioperative coverage

## 2017-03-22 NOTE — CARE PLAN
Problem: Fluid Volume:  Goal: Will maintain balanced intake and output  Outcome: PROGRESSING AS EXPECTED  REFUED LIQ DINNER BUT HAS DRANK SOME WATER. CONT OIV FLUIDS OF NS @ 150ML/HR.

## 2017-03-22 NOTE — DISCHARGE PLANNING
"Transitional Care Navigator:    Met with the patient and her family at bedside regarding discharge planning needs.  Pt has been recommended to have continued PT/OT/ST, and would benefit from skilled rehab prior to returning home.  Pt voices unhappiness r/t this plan, but also states that she doesn't want to \"fall all over\" when she gets home.  List of local facilities given to son and daughter-in-law; they will consider options and contact me.  SW aware.  "

## 2017-03-22 NOTE — DOCUMENTATION QUERY
DOCUMENTATION QUERY    PROVIDERS: Please select “Cosign w/ note”to reply to query.    To better represent the severity of illness and risk of mortality of your patient, please review the following information and exercise your independent professional judgment in responding to this query.     Low BMI 16.87 (<19), anorexia and 15-pound weight loss are noted in the History and Physical, Progress Notes and Lab Results . Considering the clinical findings, risk factors, and treatment, can a diagnosis be provided to support these findings?    • Mild Protein Calorie Malnutrition  • Moderate Protein Calorie Malnutrition  • Severe Protein Calorie Malnutrition  • Cachexia  • Underweight  • Findings of no clinical significance  • Other explanation of clinical findings  • Unable to determine        The medical record reflects the following:   Clinical Findings  Noted: anorexia with 15- pound weight loss; Low BMI 16.87 (<19); Albumin 2.9-2.2;    Treatment  Dietary evaluation/monitor/treatment; Protein supplements with meals; Labs   Risk Factors  Acute Cholecystitis; Choledocholithiasis; COPD; Poor oral intake   Location within medical record  History and Physical, Progress Notes and Lab Results      Thank you,   Alissa Hartmann RN   Clinical   Phone - [phone]

## 2017-03-22 NOTE — PROGRESS NOTES
AWOKE DISORIENTED AND CONFUSED. ATTEMPTING TO GET UP UNATTENDED.BED ALARM SET. FOUND TO HAVE BEEN INCONT OF URINE. GIVEN A BED BATH AND LINEN CHANGED. ALSO SHE HAD PARTIALLY REMOVED DRSG FROM DRAIN SITE. STERILE DRSG REAPPLIED. CLEAN MEPILEX APPLIED TO BUTTOCKS AS PREVENTATIVE. NO REDNESS OR BREAK DOWN NOTED. SKIN INTACT.

## 2017-03-22 NOTE — CARE PLAN
Problem: Safety  Goal: Will remain free from injury  Outcome: PROGRESSING AS EXPECTED  CONT TO BE DISORIENTED AT TIMES. BED ALARM SET, FREQ ROUNDING. REORIENT PRN.

## 2017-03-22 NOTE — PROGRESS NOTES
AWAKE AND ALERT SITTING UP IN BED VISITING WITH FAMILY. DENIES C/O PAIN OR DISCOMFORT. REFUSES DINNER, STATES THAT SHE IS NOT HUNGRY. V/S WITHIN PERIMETERS. T TUBE WITH SCANT AMT OF BILE TYPE DRAINAGE..

## 2017-03-22 NOTE — PROGRESS NOTES
Surgical Progress Note    Author: Lucille Polanco Date & Time created: 3/22/2017   10:36 AM     Interval Events:  ERCP yesterday with stone extraction and sphincterotomy. Some confusion overnight, seems to be at baseline this morning. Tolerated full liquids for breakfast.    Review of Systems   Constitutional: Negative for fever and chills.   Gastrointestinal: Negative for nausea and vomiting.     Hemodynamics:  Temp (24hrs), Av.7 °C (98 °F), Min:36.1 °C (97 °F), Max:36.9 °C (98.4 °F)  Temperature: 36.4 °C (97.6 °F)  Pulse  Av.2  Min: 52  Max: 117Heart Rate (Monitored): 86  Blood Pressure : 144/64 mmHg, NIBP: 119/93 mmHg     Respiratory:    Respiration: 16, Pulse Oximetry: 99 %     Work Of Breathing / Effort: Mild  RUL Breath Sounds: Clear, RML Breath Sounds: Clear, RLL Breath Sounds: Diminished, JOSELYN Breath Sounds: Clear, LLL Breath Sounds: Diminished  Neuro:  GCS       Fluids:    Intake/Output Summary (Last 24 hours) at 17 1036  Last data filed at 17 0400   Gross per 24 hour   Intake   1000 ml   Output    180 ml   Net    820 ml     Weight: 45.7 kg (100 lb 12 oz)  Current Diet Order   Procedures   • DIET ORDER     Physical Exam   Constitutional: She is oriented to person, place, and time. She appears well-developed and well-nourished. No distress.   HENT:   Head: Normocephalic and atraumatic.   Eyes: Conjunctivae are normal.   Neck: Normal range of motion.   Cardiovascular: Normal rate.    Pulmonary/Chest: Effort normal.   Abdominal: Soft.   Neurological: She is alert and oriented to person, place, and time.   Skin: Skin is warm and dry. She is not diaphoretic.   Psychiatric: She has a normal mood and affect. Her behavior is normal.     Labs:  Recent Results (from the past 24 hour(s))   ACCU-CHEK GLUCOSE    Collection Time: 17 11:48 AM   Result Value Ref Range    Glucose - Accu-Ck 69 65 - 99 mg/dL   ACCU-CHEK GLUCOSE    Collection Time: 17 12:45 PM   Result Value Ref Range     Glucose - Accu-Ck 60 (L) 65 - 99 mg/dL   ACCU-CHEK GLUCOSE    Collection Time: 03/21/17  5:29 PM   Result Value Ref Range    Glucose - Accu-Ck 100 (H) 65 - 99 mg/dL   ACCU-CHEK GLUCOSE    Collection Time: 03/21/17  8:28 PM   Result Value Ref Range    Glucose - Accu-Ck 82 65 - 99 mg/dL   CBC WITH DIFFERENTIAL    Collection Time: 03/22/17  1:50 AM   Result Value Ref Range    WBC 9.1 4.8 - 10.8 K/uL    RBC 3.71 (L) 4.20 - 5.40 M/uL    Hemoglobin 11.0 (L) 12.0 - 16.0 g/dL    Hematocrit 35.9 (L) 37.0 - 47.0 %    MCV 96.8 81.4 - 97.8 fL    MCH 29.6 27.0 - 33.0 pg    MCHC 30.6 (L) 33.6 - 35.0 g/dL    RDW 50.1 (H) 35.9 - 50.0 fL    Platelet Count 253 164 - 446 K/uL    MPV 11.3 9.0 - 12.9 fL    Nucleated RBC 0.00 /100 WBC    NRBC (Absolute) 0.00 K/uL    Neutrophils-Polys 83.20 (H) 44.00 - 72.00 %    Lymphocytes 6.20 (L) 22.00 - 41.00 %    Monocytes 7.10 0.00 - 13.40 %    Eosinophils 0.00 0.00 - 6.90 %    Basophils 0.00 0.00 - 1.80 %    Neutrophils (Absolute) 7.89 (H) 2.00 - 7.15 K/uL    Lymphs (Absolute) 0.56 (L) 1.00 - 4.80 K/uL    Monos (Absolute) 0.65 0.00 - 0.85 K/uL    Eos (Absolute) 0.00 0.00 - 0.51 K/uL    Baso (Absolute) 0.00 0.00 - 0.12 K/uL    Anisocytosis 1+     Macrocytosis 1+    COMP METABOLIC PANEL    Collection Time: 03/22/17  1:50 AM   Result Value Ref Range    Sodium 139 135 - 145 mmol/L    Potassium 3.6 3.6 - 5.5 mmol/L    Chloride 112 96 - 112 mmol/L    Co2 19 (L) 20 - 33 mmol/L    Anion Gap 8.0 0.0 - 11.9    Glucose 82 65 - 99 mg/dL    Bun 28 (H) 8 - 22 mg/dL    Creatinine 0.63 0.50 - 1.40 mg/dL    Calcium 8.0 (L) 8.5 - 10.5 mg/dL    AST(SGOT) 13 12 - 45 U/L    ALT(SGPT) 9 2 - 50 U/L    Alkaline Phosphatase 75 30 - 99 U/L    Total Bilirubin 0.7 0.1 - 1.5 mg/dL    Albumin 2.4 (L) 3.2 - 4.9 g/dL    Total Protein 5.0 (L) 6.0 - 8.2 g/dL    Globulin 2.6 1.9 - 3.5 g/dL    A-G Ratio 0.9 g/dL   ESTIMATED GFR    Collection Time: 03/22/17  1:50 AM   Result Value Ref Range    GFR If  >60 >60  mL/min/1.73 m 2    GFR If Non African American >60 >60 mL/min/1.73 m 2   DIFFERENTIAL MANUAL    Collection Time: 03/22/17  1:50 AM   Result Value Ref Range    Bands-Stabs 3.50 0.00 - 10.00 %    Manual Diff Status PERFORMED    PERIPHERAL SMEAR REVIEW    Collection Time: 03/22/17  1:50 AM   Result Value Ref Range    Peripheral Smear Review see below    PLATELET ESTIMATE    Collection Time: 03/22/17  1:50 AM   Result Value Ref Range    Plt Estimation Normal    MORPHOLOGY    Collection Time: 03/22/17  1:50 AM   Result Value Ref Range    RBC Morphology Present     Poikilocytosis 1+     Tear Drop Cells 1+     Echinocytes 1+    ACCU-CHEK GLUCOSE    Collection Time: 03/22/17  5:52 AM   Result Value Ref Range    Glucose - Accu-Ck 80 65 - 99 mg/dL     Medical Decision Making, by Problem:  Active Hospital Problems    Diagnosis   • Cholecystitis, acute [K81.0]   • Leucocytosis [D72.829]   • Normocytic anemia [D64.9]   • Hypokalemia [E87.6]   • Chronic bronchitis (CMS-HCC) [J42]   • Memory loss [R41.3]     Plan:  Clamp cholecystostomy tube, place back to gravity if she has any increased pain, nausea or vomiting.   Transition to PO antibiotics.   Advance diet as tolerated. Encouraged taking 3-4 boost shakes per day.   Recheck labs tomorrow morning.     Quality Measures:  Labs reviewed  S[facility] catheter: No [employer]                    Discussed patient condition with Family, RN, Patient and hospitalist

## 2017-03-22 NOTE — RESPIRATORY CARE
COPD EDUCATION by COPD CLINICAL EDUCATOR  3/22/2017 at 6:40 AM by Marlene Roberts     Patient reviewed by COPD education team. Patient does not qualify for COPD program.

## 2017-03-22 NOTE — PROCEDURES
DATE OF SERVICE:  03/21/2017    PROCEDURE PERFORMED:  ERCP with sphincterotomy and stone extraction.    PREPROCEDURE DIAGNOSES:  1.  Acute cholecystitis.  2.  Choledocholithiasis.    POSTPROCEDURE DIAGNOSES:  1.  Periampullary diverticulum.  2.  Dilated biliary tree.  3.  Large stone in the distal common bile duct which migrated proximally into   the left intrahepatic duct.  4.  Successful sphincterotomy.  5.  Successful stone extraction using balloon.    INDICATION FOR THE PROCEDURE:  This is an 82-year-old female who presented   with abdominal pain.  She was found to have acute cholecystitis.  The   gallbladder was severely distended.  She had a cholecystostomy tube.  Her pain   is improving.  CT scan shows choledocholithiasis.  Liver tests are normal.      CONSENT:  The risks, benefits and alternatives to ERCP were discussed with the   patient and patient's family.  All questions were answered.  They are aware   of the potential risks which included but not limited to bleeding, infection,   perforation, pancreatitis, cardiopulmonary complications, prolonged   hospitalization, and death.  They provided written and verbal consent.     SEDATION:  The procedure was done under general anesthesia.    ANESTHESIOLOGIST:  Poncho Gomez MD    PROCEDURE IN DETAIL:  After anesthesia was induced, she was placed in the   prone position.  The duodenoscope was inserted in the posterior pharynx and   advanced in the esophagus.  Esophagus was grossly normal.  The scope was   advanced in the stomach.  Stomach was insufflated and inspected.  The stomach   was grossly normal.  Scope was advanced through the pylorus into the duodenum.    The ampulla was located.  The ampulla appeared normal.  There was a   periampullary diverticulum.  A cannulatome was then used to achieve common   bile duct cannulation.  Cannulation of the common bile duct was achieved   immediately.  The pancreatic duct was not cannulated nor injected.  Once deep    common bile duct cannulation was confirmed with guidewire placement and   suctioning of bile, a cholangiogram was obtained.  A cholangiogram showed a   dilated common bile duct, common hepatic duct, and intrahepatic ducts.  There   was a filling defect in the distal common bile duct consistent with the stone.    Sphincterotomy was then performed over a guidewire in standard fashion.  No   bleeding occurred.  The sphincterotome was removed and the guidewire was left   in place.  A balloon was inserted over a guidewire and advanced into the   common hepatic duct.  With the initial balloon sweep, no stone passed into the   duct.  Additional balloon sweeps were negative.  A cholangiogram was then   obtained and it was seen that the stone had migrated into the large left   intrahepatic duct.  With suctioning, the stone was able to pass distally into   the common hepatic duct.  The balloon was then able to be passed around the   stone.  The balloon was then inflated and the stone was swept into the   duodenum.  The stone was quite large, measuring at least 10 mm.  Additional   balloon sweeps were negative.  The duodenum and stomach were suctioned.  The   procedure was terminated.  She tolerated it well.    IMPRESSION:  1.  Periampullary diverticulum.  2.  Dilated biliary tree.  3.  Large stone in the distal common bile duct which migrated into the left   intrahepatic duct.  4.  Successful sphincterotomy.  5.  Stone removal with balloon.    RECOMMENDATIONS:  1.  Advance diet.  2.  Eventual cholecystectomy.  3.  Continue antibiotics.       ____________________________________     MD RADHIKA SAMUELS / KIMBERLY    DD:  03/21/2017 14:53:53  DT:  03/21/2017 17:24:36    D#:  [id number]  Job#:  [id number]    cc: _____ _____

## 2017-03-22 NOTE — PROGRESS NOTES
Gastroenterology Progress Note     Author: Constantino Vásquez   Date & Time Created: 3/22/2017 12:28 PM    Interval History:  Tolerating diet  Denies pain     Review of Systems:  Review of Systems   Gastrointestinal: Negative.        Physical Exam:  Physical Exam   Cardiovascular: Normal rate.    Pulmonary/Chest: Effort normal.   Abdominal: Soft. Bowel sounds are normal. She exhibits no distension. There is tenderness.       Labs:  Recent Labs      17   1440  17   1710  177   ISTATSPEC  Venous  Venous  Venous     Recent Labs      17   1440   TROPONINI  <0.02     Recent Labs      17   0119  17   0113  03/22/17   0150   SODIUM  140  140  139   POTASSIUM  3.6  3.3*  3.6   CHLORIDE  107  109  112   CO2  25  22  19*   BUN  29*  25*  28*   CREATININE  0.86  0.69  0.63   PHOSPHORUS  3.0   --    --    CALCIUM  8.3*  7.4*  8.0*     Recent Labs      17   1440  17   01117   0113  03/22/17   0150   ALTSGPT  18  12  10  9   ASTSGOT  23  13  13  13   ALKPHOSPHAT  131*  99  71  75   TBILIRUBIN  1.5  1.0  0.9  0.7   LIPASE  15   --   <3*   --    GLUCOSE  146*  115*  99  82     Recent Labs      17   1440  17   01117   0113  03/22/17   0150   RBC  4.41  3.70*  3.38*  3.71*   HEMOGLOBIN  13.7  11.4*  10.3*  11.0*   HEMATOCRIT  42.2  34.8*  32.1*  35.9*   PLATELETCT  306  227  201  253   PROTHROMBTM  17.5*   --    --    --    APTT  26.8   --    --    --    INR  1.46*   --    --    --      Recent Labs      17   01117   0113  03/22/17   0150   WBC  19.5*  6.4  9.1   NEUTSPOLYS  87.70*  83.50*  83.20*   LYMPHOCYTES  9.60*  5.20*  6.20*   MONOCYTES  1.80  1.70  7.10   EOSINOPHILS  0.00  0.00  0.00   BASOPHILS  0.00  0.00  0.00   ASTSGOT  13  13  13   ALTSGPT  12  10  9   ALKPHOSPHAT  99  71  75   TBILIRUBIN  1.0  0.9  0.7     Hemodynamics:  Temp (24hrs), Av.7 °C (98 °F), Min:36.1 °C (97 °F), Max:36.9 °C (98.4 °F)  Temperature: 36.4 °C (97.6  °F)  Pulse  Av.2  Min: 52  Max: 117Heart Rate (Monitored): 86  Blood Pressure : 144/64 mmHg, NIBP: 119/93 mmHg     Respiratory:    Respiration: 16, Pulse Oximetry: 99 %     Work Of Breathing / Effort: Mild  RUL Breath Sounds: Clear, RML Breath Sounds: Clear, RLL Breath Sounds: Diminished, JOSELYN Breath Sounds: Clear, LLL Breath Sounds: Diminished  Fluids:    Intake/Output Summary (Last 24 hours) at 17 1228  Last data filed at 17 1129   Gross per 24 hour   Intake   1000 ml   Output    340 ml   Net    660 ml     Weight: 45.7 kg (100 lb 12 oz)  GI/Nutrition:  Orders Placed This Encounter   Procedures   • DIET ORDER     Standing Status: Standing      Number of Occurrences: 1      Standing Expiration Date:      Order Specific Question:  Diet:     Answer:  Full Liquid [11]     Medical Decision Making, by Problem:  Active Hospital Problems    Diagnosis   • Cholecystitis, acute [K81.0]   • Leucocytosis [D72.829]   • Normocytic anemia [D64.9]   • Hypokalemia [E87.6]   • Chronic bronchitis (CMS-HCC) [J42]   • Memory loss [R41.3]       Plan:  -doing well after ERCP  -converting to oral antibiotics  -will sign off; call with issues     Core Measures

## 2017-03-23 PROBLEM — I48.0 PAROXYSMAL A-FIB (HCC): Status: ACTIVE | Noted: 2017-03-23

## 2017-03-23 LAB
ALBUMIN SERPL BCP-MCNC: 2.3 G/DL (ref 3.2–4.9)
ALBUMIN/GLOB SERPL: 1 G/DL
ALP SERPL-CCNC: 65 U/L (ref 30–99)
ALT SERPL-CCNC: 9 U/L (ref 2–50)
ANION GAP SERPL CALC-SCNC: 5 MMOL/L (ref 0–11.9)
ANION GAP SERPL CALC-SCNC: 5 MMOL/L (ref 0–11.9)
AST SERPL-CCNC: 10 U/L (ref 12–45)
BASOPHILS # BLD AUTO: 0 % (ref 0–1.8)
BASOPHILS # BLD: 0 K/UL (ref 0–0.12)
BILIRUB SERPL-MCNC: 0.5 MG/DL (ref 0.1–1.5)
BUN SERPL-MCNC: 33 MG/DL (ref 8–22)
BUN SERPL-MCNC: 35 MG/DL (ref 8–22)
CALCIUM SERPL-MCNC: 8.3 MG/DL (ref 8.5–10.5)
CALCIUM SERPL-MCNC: 8.5 MG/DL (ref 8.5–10.5)
CHLORIDE SERPL-SCNC: 112 MMOL/L (ref 96–112)
CHLORIDE SERPL-SCNC: 112 MMOL/L (ref 96–112)
CO2 SERPL-SCNC: 22 MMOL/L (ref 20–33)
CO2 SERPL-SCNC: 24 MMOL/L (ref 20–33)
CREAT SERPL-MCNC: 0.72 MG/DL (ref 0.5–1.4)
CREAT SERPL-MCNC: 0.75 MG/DL (ref 0.5–1.4)
EKG IMPRESSION: NORMAL
EKG IMPRESSION: NORMAL
EOSINOPHIL # BLD AUTO: 0.08 K/UL (ref 0–0.51)
EOSINOPHIL NFR BLD: 0.9 % (ref 0–6.9)
ERYTHROCYTE [DISTWIDTH] IN BLOOD BY AUTOMATED COUNT: 50.8 FL (ref 35.9–50)
GFR SERPL CREATININE-BSD FRML MDRD: >60 ML/MIN/1.73 M 2
GFR SERPL CREATININE-BSD FRML MDRD: >60 ML/MIN/1.73 M 2
GLOBULIN SER CALC-MCNC: 2.3 G/DL (ref 1.9–3.5)
GLUCOSE BLD-MCNC: 116 MG/DL (ref 65–99)
GLUCOSE BLD-MCNC: 143 MG/DL (ref 65–99)
GLUCOSE BLD-MCNC: 147 MG/DL (ref 65–99)
GLUCOSE BLD-MCNC: 163 MG/DL (ref 65–99)
GLUCOSE SERPL-MCNC: 151 MG/DL (ref 65–99)
GLUCOSE SERPL-MCNC: 152 MG/DL (ref 65–99)
HCT VFR BLD AUTO: 34.1 % (ref 37–47)
HGB BLD-MCNC: 10.7 G/DL (ref 12–16)
LYMPHOCYTES # BLD AUTO: 0.54 K/UL (ref 1–4.8)
LYMPHOCYTES NFR BLD: 6.1 % (ref 22–41)
MANUAL DIFF BLD: NORMAL
MCH RBC QN AUTO: 30.2 PG (ref 27–33)
MCHC RBC AUTO-ENTMCNC: 31.4 G/DL (ref 33.6–35)
MCV RBC AUTO: 96.3 FL (ref 81.4–97.8)
MONOCYTES # BLD AUTO: 0.93 K/UL (ref 0–0.85)
MONOCYTES NFR BLD AUTO: 10.4 % (ref 0–13.4)
MORPHOLOGY BLD-IMP: NORMAL
NEUTROPHILS # BLD AUTO: 7.35 K/UL (ref 2–7.15)
NEUTROPHILS NFR BLD: 81.7 % (ref 44–72)
NEUTS BAND NFR BLD MANUAL: 0.9 % (ref 0–10)
NRBC # BLD AUTO: 0 K/UL
NRBC BLD AUTO-RTO: 0 /100 WBC
PLATELET # BLD AUTO: 239 K/UL (ref 164–446)
PMV BLD AUTO: 10.9 FL (ref 9–12.9)
POTASSIUM SERPL-SCNC: 3.7 MMOL/L (ref 3.6–5.5)
POTASSIUM SERPL-SCNC: 4.2 MMOL/L (ref 3.6–5.5)
PROT SERPL-MCNC: 4.6 G/DL (ref 6–8.2)
RBC # BLD AUTO: 3.54 M/UL (ref 4.2–5.4)
SODIUM SERPL-SCNC: 139 MMOL/L (ref 135–145)
SODIUM SERPL-SCNC: 141 MMOL/L (ref 135–145)
WBC # BLD AUTO: 8.9 K/UL (ref 4.8–10.8)

## 2017-03-23 PROCEDURE — 700111 HCHG RX REV CODE 636 W/ 250 OVERRIDE (IP): Performed by: INTERNAL MEDICINE

## 2017-03-23 PROCEDURE — 36415 COLL VENOUS BLD VENIPUNCTURE: CPT

## 2017-03-23 PROCEDURE — 80053 COMPREHEN METABOLIC PANEL: CPT

## 2017-03-23 PROCEDURE — 85007 BL SMEAR W/DIFF WBC COUNT: CPT

## 2017-03-23 PROCEDURE — 700102 HCHG RX REV CODE 250 W/ 637 OVERRIDE(OP): Performed by: INTERNAL MEDICINE

## 2017-03-23 PROCEDURE — A9270 NON-COVERED ITEM OR SERVICE: HCPCS | Performed by: INTERNAL MEDICINE

## 2017-03-23 PROCEDURE — 85027 COMPLETE CBC AUTOMATED: CPT

## 2017-03-23 PROCEDURE — 99233 SBSQ HOSP IP/OBS HIGH 50: CPT | Performed by: INTERNAL MEDICINE

## 2017-03-23 PROCEDURE — 770020 HCHG ROOM/CARE - TELE (206)

## 2017-03-23 PROCEDURE — 82962 GLUCOSE BLOOD TEST: CPT | Mod: 91

## 2017-03-23 PROCEDURE — 80048 BASIC METABOLIC PNL TOTAL CA: CPT

## 2017-03-23 RX ORDER — DIGOXIN 0.25 MG/ML
250 INJECTION INTRAMUSCULAR; INTRAVENOUS
Status: DISCONTINUED | OUTPATIENT
Start: 2017-03-23 | End: 2017-03-24 | Stop reason: HOSPADM

## 2017-03-23 RX ORDER — CHOLECALCIFEROL (VITAMIN D3) 125 MCG
1000 CAPSULE ORAL DAILY
Status: DISCONTINUED | OUTPATIENT
Start: 2017-03-23 | End: 2017-03-24 | Stop reason: HOSPADM

## 2017-03-23 RX ORDER — IPRATROPIUM BROMIDE AND ALBUTEROL SULFATE 2.5; .5 MG/3ML; MG/3ML
SOLUTION RESPIRATORY (INHALATION)
Status: ACTIVE
Start: 2017-03-23 | End: 2017-03-23

## 2017-03-23 RX ORDER — FUROSEMIDE 10 MG/ML
20 INJECTION INTRAMUSCULAR; INTRAVENOUS
Status: DISCONTINUED | OUTPATIENT
Start: 2017-03-23 | End: 2017-03-24 | Stop reason: HOSPADM

## 2017-03-23 RX ADMIN — METRONIDAZOLE 500 MG: 500 TABLET ORAL at 06:01

## 2017-03-23 RX ADMIN — CIPROFLOXACIN HYDROCHLORIDE 500 MG: 500 TABLET, FILM COATED ORAL at 22:04

## 2017-03-23 RX ADMIN — CIPROFLOXACIN HYDROCHLORIDE 500 MG: 500 TABLET, FILM COATED ORAL at 09:00

## 2017-03-23 RX ADMIN — METRONIDAZOLE 500 MG: 500 TABLET ORAL at 22:04

## 2017-03-23 RX ADMIN — METRONIDAZOLE 500 MG: 500 TABLET ORAL at 13:14

## 2017-03-23 RX ADMIN — DILTIAZEM HYDROCHLORIDE 60 MG: 30 TABLET, FILM COATED ORAL at 13:13

## 2017-03-23 RX ADMIN — CYANOCOBALAMIN TAB 500 MCG 1000 MCG: 500 TAB at 17:47

## 2017-03-23 RX ADMIN — DILTIAZEM HYDROCHLORIDE 60 MG: 30 TABLET, FILM COATED ORAL at 06:01

## 2017-03-23 RX ADMIN — INSULIN LISPRO 1 UNITS: 100 INJECTION, SOLUTION INTRAVENOUS; SUBCUTANEOUS at 22:06

## 2017-03-23 RX ADMIN — FUROSEMIDE 20 MG: 10 INJECTION, SOLUTION INTRAVENOUS at 17:47

## 2017-03-23 ASSESSMENT — ENCOUNTER SYMPTOMS
CHILLS: 0
VOMITING: 0
FEVER: 0
NAUSEA: 0

## 2017-03-23 ASSESSMENT — COPD QUESTIONNAIRES
COPD SCREENING SCORE: 2
DURING THE PAST 4 WEEKS HOW MUCH DID YOU FEEL SHORT OF BREATH: NONE/LITTLE OF THE TIME
DO YOU EVER COUGH UP ANY MUCUS OR PHLEGM?: NO/ONLY WITH OCCASIONAL COLDS OR INFECTIONS
HAVE YOU SMOKED AT LEAST 100 CIGARETTES IN YOUR ENTIRE LIFE: NO/DON'T KNOW

## 2017-03-23 ASSESSMENT — PAIN SCALES - GENERAL
PAINLEVEL_OUTOF10: 4
PAINLEVEL_OUTOF10: 0

## 2017-03-23 ASSESSMENT — LIFESTYLE VARIABLES: DO YOU DRINK ALCOHOL: NO

## 2017-03-23 NOTE — DISCHARGE PLANNING
Transitional Care Navigator:  Received call from [facility]; they have accepted patient whenever she is medically cleared.  Info left for OBIE.

## 2017-03-23 NOTE — CARE PLAN
Problem: Safety  Goal: Will remain free from falls  Outcome: PROGRESSING AS EXPECTED  Discussed with patient the importance to use call light when assistance is needed. Patient verbalized understanding. Patient has bed alarm on, call light within reach, treaded socks on, and hourly rounding in place.     Problem: Knowledge Deficit  Goal: Knowledge of disease process/condition, treatment plan, diagnostic tests, and medications will improve  Outcome: PROGRESSING SLOWER THAN EXPECTED  Patient educated regarding activity, diet, meds and plan of care.

## 2017-03-23 NOTE — THERAPY
OT tx attempted.  Pt sleeping upon arrival and per pt's family, pt has been lethargic all day since receiving Ativan.  Family requesting hold until pt more alert.  Will attempt back as able.

## 2017-03-23 NOTE — CARE PLAN
Problem: Safety  Goal: Will remain free from injury  Outcome: PROGRESSING AS EXPECTED  Bed locked in low position, call light in reach, treaded socks on. Bed exit alarms armed.        Problem: Knowledge Deficit  Goal: Knowledge of disease process/condition, treatment plan, diagnostic tests, and medications will improve  Outcome: PROGRESSING SLOWER THAN EXPECTED  POC discussed, pt verbalizes understanding.  Reinforcement needed prn.

## 2017-03-23 NOTE — PROGRESS NOTES
Received bedside report from Ute WATSON .  Pt is a/o x 3 at present.  POC discussed w/ pt, verbalizes understanding,  Bed is locked, low, belongings in reach, call light in reach.  Verbalized understanding to call for assistance, ivf continue, Sao2 continuous on 2 lpm at 96%.  Multiple family members at bedside. Questions answered as able

## 2017-03-23 NOTE — PSYCHIATRY
"PSYCHIATRIC CONSULTATION:  Reason for admission:     Reason for consult: capacity   Requesting Physician:Vignesh   Supervising Physician:         Legal status:    No hold required    Chief Complaint:  \"I'm fine. I don't need to see psychiatry\"     HPI:   Ms. Vora is an 83 y/o woman here for AMS and abdominal pain. She had been eating less and lost up to 15 lbs recently. She was found to have cholecystitis. She had an ERCP with sphincterectomy and stone extraction. She was slightly confused after this but then returned to her baseline. She has COPD and is oxygen dependent.     When I asked her what medical conditions she had she did not know. She reported she had a surgery for abdominal pain but didn't know what was wrong. She eventually came up with \"gall bladder\" after prompted multiple times. She didn't know why she was on oxygen. She referred me to her son to ask why she was on oxygen. She reports she has trouble with her memory. She was having trouble at the end of the conversation remembering things we discussed at the beginning of the conversation. She wasn't able to tell me how she was able to do many things at home. She did say she didn't cook but used the microwave. She didn't remember not being able to do things easily in physical therapy sessions. She became angry when asked about things she might have difficulty doing. She insisted she didn't need any help at home with chores, even though her function is decreased. She wasn't able to discuss this without getting mildly agitated and lacked insight. She wasn't fully oriented. She was confabulating. Discussed with her I may start a medication that could help her thinking, and she declined.     Gained collateral from family, who states this is her baseline and that she has been having increasing difficulty remembering things and taking care of herself. They reports that mood lability preceded any problems with memory loss and cognitive decline, but " that it has gotten worse over the last few years. They report she refuses all daily medications and that she would not start any medication for mood.         Psychiatric Review of Systems:current symptoms as reported by pt.  Depression:      Denies   Britney:no signs/symptoms. Some irritability   Anxiety/Panic Attacks  Denies   PTSD symptom: denies   Psychosis:no evidence psychosis   Other:clear memory and cognitive deficits       Medical Review of Systems: as reported by pt. All systems reviewed. Only those found to be + are noted below. All others are negative.   Neurological:    TBIs: none    SZs:none    Strokes:vascular disease, small vessel ischemic disease.    Other:cognitive decline, cerbral atrophy   Other medical symptoms:  Abdominal pain resolving     Psychiatric Examination: observed phenomenon:  Vitals:  Filed Vitals:    03/22/17 2010 03/22/17 2320 03/23/17 0330 03/23/17 0810   BP: 120/82 123/65 126/53 116/49   Pulse: 76 60 80 77   Temp: 36.3 °C (97.4 °F) 36.9 °C (98.4 °F) 36.8 °C (98.3 °F) 36.8 °C (98.3 °F)   Resp: 16 16 16 16   Height:       Weight: 48.3 kg (106 lb 7.7 oz)      SpO2: 96% 94% 96% 94%       Musculoskeletal(abnormal movements, gait, etc): +psychomotor retardation   Appearance:grooming wnl   Thoughts:somewhat comfused but not disorganized  Speech:tone sometimes angry, sometimes wnl   Mood:    Irritable at times, euthymic at times   Affect:    Labile   SI/HI:   None   Attention/Alertness:   Awake, alert  Memory:    impaired  Orientation:    Not fully oriented  Fund of Knowledge:    decreased  Insight/Judgement into psychiatric symptoms: impaired  Neurological Testing:( ie clock, cube drawing, MMSE, MOCA,etc.) na     Other system(s) examined: (neurological, skin, GI, etc)          Past Psychiatric Hx:   No previous psychiatric treatment. Per family she has had mood lability her whole life.     Family Psychiatric Hx:  Denies   Social Hx:  Lives alone. Has ex-. Kids very involved.      Drug/Alcohol/Tobacco Hx:   Drugs:denies    Alcohol:denies    Tobacco:denies     Medical Hx: labs, MARS, medications, etc were reviewed. Only those findings of potential interest to psychiatry are noted below:  Medical Conditions:     Past Medical History   Diagnosis Date   • COPD    • Breast cancer (CMS-HCC)    • Hyperlipidemia    • Depression    • Arthritis    • Cancer (CMS-HCC)    • Fall    • Indigestion    • Urinary incontinence    • Chronic bronchitis (CMS-HCC) 3/21/2017     Cholecystitis, treated     Allergies: Review of patient's allergies indicates no known allergies.    Medications (currently prescribed at [facility]):  No current facility-administered medications on file prior to encounter.     No current outpatient prescriptions on file prior to encounter.       Labs:  Recent Labs      03/21/17   0113  03/22/17   0150  03/23/17   0330   WBC  6.4  9.1  8.9   RBC  3.38*  3.71*  3.54*   HEMOGLOBIN  10.3*  11.0*  10.7*   HEMATOCRIT  32.1*  35.9*  34.1*   MCV  95.0  96.8  96.3   MCH  30.5  29.6  30.2   RDW  47.6  50.1*  50.8*   PLATELETCT  201  253  239   MPV  11.9  11.3  10.9   NEUTSPOLYS  83.50*  83.20*  81.70*   LYMPHOCYTES  5.20*  6.20*  6.10*   MONOCYTES  1.70  7.10  10.40   EOSINOPHILS  0.00  0.00  0.90   BASOPHILS  0.00  0.00  0.00   RBCMORPHOLO  Present  Present   --      Recent Labs      03/21/17   0113  03/22/17   0150  03/23/17   0330   SODIUM  140  139  139   POTASSIUM  3.3*  3.6  3.7   CHLORIDE  109  112  112   CO2  22  19*  22   GLUCOSE  99  82  152*   BUN  25*  28*  35*       ECG: QTc    Cranial Imaging: reviewed  [id number] DUCTS   Final Result      Intraoperative images as above described.      CT-DRAIN-RETROPERITONEAL   Final Result      1.  CT GUIDED PERCUTANEOUS CHOLECYSTOSTOMY WITH PLACEMENT OF AN 8 [place] LOCKING LOOP CATHETER. PURULENT MALODOROUS BILE WAS OBTAINED.      2.  BEFORE CATHETER REMOVAL, A CHOLECYSTOGRAM WITH CHOLANGIOGRAM WOULD BE RECOMMENDED TO ASSURE PATENCY OF THE  COMMON DUCT. LIKEWISE, A TRACTOGRAM MAY ALSO BE HELPFUL TO MAKE SURE THAT THE CATHETER TRACT HAS MATURED SO THAT THERE IS NOT INTRAPERITONEAL    BILE LEAKAGE AFTER CATHETER REMOVAL.      ECHOCARDIOGRAM COMP W/O CONT    (Results Pending)     3/19/2017 3:30 PM  CT exam of head     HISTORY/REASON FOR EXAM:  Altered Mental Status.      TECHNIQUE/EXAM DESCRIPTION AND NUMBER OF VIEWS:  CT of the head without contrast.    The study was performed on a helical multidetector CT scanner. Contiguous 2.5 mm axial sections were obtained from the skull base through the vertex.    Up to date radiation dose reduction adjustments have been utilized to meet ALARA standards for radiation dose reduction.    COMPARISON:  None available    FINDINGS:  The calvariae and skull base are unremarkable. There are no extraaxial fluid collections. There is a pattern of cerebral atrophy manifest as enlargement of sulcal markings and ventricular prominence.  The ventricular system and basal cisterns are   otherwise unremarkable. There are areas of hypodensity in the white matter most consistent with small vessel ischemic change versus demyelination or gliosis. There are no hemorrhagic lesions. There are no mass effects or shift of midline structures.    The brainstem and posterior fossa structures are unremarkable.    Paranasal sinuses in the field of view are unremarkable.    Mastoids in the field of view are unremarkable.           Impression        1.  Cerebral atrophy.    2.  White matter lucencies most consistent with small vessel ischemic change versus demyelination or gliosis.    3.  Otherwise, Head CT without contrast with no acute findings. No evidence of acute cerebral infarction, hemorrhage or mass lesion.     CXR     3/19/2017 2:52 PM    HISTORY/REASON FOR EXAM:  Cough.      TECHNIQUE/EXAM DESCRIPTION AND NUMBER OF VIEWS:  Single portable view of the chest.    COMPARISON: None    FINDINGS:  The cardiac silhouette is enlarged.  No  pulmonary infiltrates or consolidations are noted.  No pleural abnormalities are noted.         Impression        Enlarged cardiac silhouette without evidence of acute disease.     Carotic Duplex: from 2013      FINDINGS   Right carotid.    Very mild plaque of the carotid bifurcation, irregular on the surface and    heterogeneous plaque with mixed acoustic densities.     Doppler velocities are normal. External carotid stenosis with elevated    velocities consistent with stenosis > 50%.      Left carotid.    Very mild plaque of the carotid bifurcation. Doppler velocities are normal.     Irregular on the surface and heterogeneous plaque with mixed acoustic    densities. External carotid stenosis with elevated velocities consistent    with stenosis > 50%.      Bilateral vertebral artery wave forms are antegrade and normal in    character.      CONCLUSIONS   Mild bilateral internal carotid artery stenosis (<50%).    Bilat moderate Ext carotid stenosis.      Other:      ASSESSMENT: (new dx, acuity level)  Vascular Dementia       PLAN:  No hold required.   No medication required at this time, although we could start something low-dose for mood lability if she was interested, or a medication for memory. She declined.     She does not have capacity to make medical decisions at this time.

## 2017-03-23 NOTE — CARE PLAN
Problem: Nutritional:  Goal: Achieve adequate nutritional intake  Patient will consume 50% of meals   Outcome: PROGRESSING AS EXPECTED  document intake of all meals and supplements as % taken in ADL's to provide interdisciplinary communication across all shifts

## 2017-03-23 NOTE — PROGRESS NOTES
Surgical Progress Note    Author: Lucille Polanco Date & Time created: 3/23/2017   11:59 AM     Interval Events:   last night, had a dose of ativan. Sleepy today. Ate very well yesterday, hasn't eaten today.    Review of Systems   Constitutional: Negative for fever and chills.   Gastrointestinal: Negative for nausea and vomiting.     Hemodynamics:  Temp (24hrs), Av.7 °C (98.1 °F), Min:36.3 °C (97.4 °F), Max:36.9 °C (98.4 °F)  Temperature: 36.8 °C (98.3 °F)  Pulse  Av.1  Min: 52  Max: 117   Blood Pressure : 116/49 mmHg     Respiratory:    Respiration: 16, Pulse Oximetry: 94 %     Work Of Breathing / Effort: Mild  RUL Breath Sounds: Clear, RML Breath Sounds: Clear, RLL Breath Sounds: Diminished, JOSELYN Breath Sounds: Clear, LLL Breath Sounds: Diminished  Neuro:  GCS       Fluids:    Intake/Output Summary (Last 24 hours) at 17 1159  Last data filed at 17 0400   Gross per 24 hour   Intake   1150 ml   Output      0 ml   Net   1150 ml     Weight: 48.3 kg (106 lb 7.7 oz)  Current Diet Order   Procedures   • DIET ORDER     Physical Exam   Constitutional: She appears well-developed and well-nourished. No distress.   HENT:   Head: Normocephalic and atraumatic.   Eyes: Conjunctivae are normal.   Neck: Normal range of motion.   Cardiovascular: Normal rate.    Pulmonary/Chest: Effort normal.   Abdominal: Soft. She exhibits no distension. There is no tenderness.   Neurological:   Sleeping comfortably. Opens eyes to touch. Makes eye contact. Not conversational today.    Skin: Skin is warm and dry. She is not diaphoretic.   Psychiatric: She has a normal mood and affect. Her behavior is normal.     Labs:  Recent Results (from the past 24 hour(s))   ACCU-CHEK GLUCOSE    Collection Time: 17 12:07 PM   Result Value Ref Range    Glucose - Accu-Ck 133 (H) 65 - 99 mg/dL   EKG    Collection Time: 17  3:22 PM   Result Value Ref Range    Report       [facility]    Test Date:  2017  Pt  Name:    MARIAH MATSON            Department: 183  MRN:        [id number]                      Room:       12  Gender:     F                            Technician: [facility]  :        [id number]                   Requested By:AYAZ MURDOCK  Order #:    [id number]                    Reading MD: Maria Del Carmen Hussein MD    Measurements  Intervals                                Axis  Rate:       187                          P:  NE:                                      QRS:        -39  QRSD:       74                           T:          148  QT:         224  QTc:        395    Interpretive Statements  ATRIAL FIBRILLATION WITH RAPID V-RATE  LEFT AXIS DEVIATION  LOW VOLTAGE THROUGHOUT  BORDERLINE R WAVE PROGRESSION, ANTERIOR LEADS  REPOLARIZATION ABNORMALITY, PROB RATE RELATED  Compared to ECG 2017 14:46:01  Left-axis deviation now present  Low QRS voltage now present  Early repolarization now present  Sinus rhythm no longer present    Electron ically Signed On 3- 10:59:28 PDT by Maria Del Carmen Hussein MD     MAGNESIUM    Collection Time: 17  3:57 PM   Result Value Ref Range    Magnesium 1.7 1.5 - 2.5 mg/dL   EKG    Collection Time: 17  5:01 PM   Result Value Ref Range    Report       [facility]    Test Date:  2017  Pt Name:    MARIAH MATSON            Department: 183  MRN:        [id number]                      Room:       12  Gender:     F                            Technician: [facility]  :        [id number]                   Requested By:AYAZ MURDOCK  Order #:    [id number]                    Reading MD: Maria Del Carmen Hussein MD    Measurements  Intervals                                Axis  Rate:       90                           P:          10  NE:         148                          QRS:        -20  QRSD:       82                           T:          37  QT:         372  QTc:        455    Interpretive Statements  SINUS RHYTHM  ATRIAL PREMATURE COMPLEX  BORDERLINE LEFT AXIS DEVIATION  LOW  VOLTAGE THROUGHOUT  CONSIDER ANTERIOR INFARCT  Compared to ECG 03/22/2017 15:22:34  Atrial premature complex(es) now present  Myocardial infarct finding now present  Atrial fibrillation no longer present  Early repolarization no longer present    Electronically Sign ed On 3- 10:49:24 PDT by Maria Del Carmen Hussein MD     ACCU-CHEK GLUCOSE    Collection Time: 03/22/17  6:23 PM   Result Value Ref Range    Glucose - Accu-Ck 173 (H) 65 - 99 mg/dL   ACCU-CHEK GLUCOSE    Collection Time: 03/22/17  8:31 PM   Result Value Ref Range    Glucose - Accu-Ck 147 (H) 65 - 99 mg/dL   CBC WITH DIFFERENTIAL    Collection Time: 03/23/17  3:30 AM   Result Value Ref Range    WBC 8.9 4.8 - 10.8 K/uL    RBC 3.54 (L) 4.20 - 5.40 M/uL    Hemoglobin 10.7 (L) 12.0 - 16.0 g/dL    Hematocrit 34.1 (L) 37.0 - 47.0 %    MCV 96.3 81.4 - 97.8 fL    MCH 30.2 27.0 - 33.0 pg    MCHC 31.4 (L) 33.6 - 35.0 g/dL    RDW 50.8 (H) 35.9 - 50.0 fL    Platelet Count 239 164 - 446 K/uL    MPV 10.9 9.0 - 12.9 fL    Nucleated RBC 0.00 /100 WBC    NRBC (Absolute) 0.00 K/uL    Neutrophils-Polys 81.70 (H) 44.00 - 72.00 %    Lymphocytes 6.10 (L) 22.00 - 41.00 %    Monocytes 10.40 0.00 - 13.40 %    Eosinophils 0.90 0.00 - 6.90 %    Basophils 0.00 0.00 - 1.80 %    Neutrophils (Absolute) 7.35 (H) 2.00 - 7.15 K/uL    Lymphs (Absolute) 0.54 (L) 1.00 - 4.80 K/uL    Monos (Absolute) 0.93 (H) 0.00 - 0.85 K/uL    Eos (Absolute) 0.08 0.00 - 0.51 K/uL    Baso (Absolute) 0.00 0.00 - 0.12 K/uL   COMP METABOLIC PANEL    Collection Time: 03/23/17  3:30 AM   Result Value Ref Range    Sodium 139 135 - 145 mmol/L    Potassium 3.7 3.6 - 5.5 mmol/L    Chloride 112 96 - 112 mmol/L    Co2 22 20 - 33 mmol/L    Anion Gap 5.0 0.0 - 11.9    Glucose 152 (H) 65 - 99 mg/dL    Bun 35 (H) 8 - 22 mg/dL    Creatinine 0.72 0.50 - 1.40 mg/dL    Calcium 8.3 (L) 8.5 - 10.5 mg/dL    AST(SGOT) 10 (L) 12 - 45 U/L    ALT(SGPT) 9 2 - 50 U/L    Alkaline Phosphatase 65 30 - 99 U/L    Total Bilirubin 0.5 0.1 -  1.5 mg/dL    Albumin 2.3 (L) 3.2 - 4.9 g/dL    Total Protein 4.6 (L) 6.0 - 8.2 g/dL    Globulin 2.3 1.9 - 3.5 g/dL    A-G Ratio 1.0 g/dL   DIFFERENTIAL MANUAL    Collection Time: 03/23/17  3:30 AM   Result Value Ref Range    Bands-Stabs 0.90 0.00 - 10.00 %    Manual Diff Status PERFORMED    PERIPHERAL SMEAR REVIEW    Collection Time: 03/23/17  3:30 AM   Result Value Ref Range    Peripheral Smear Review see below    ESTIMATED GFR    Collection Time: 03/23/17  3:30 AM   Result Value Ref Range    GFR If African American >60 >60 mL/min/1.73 m 2    GFR If Non African American >60 >60 mL/min/1.73 m 2   BASIC METABOLIC PANEL    Collection Time: 03/23/17 10:34 AM   Result Value Ref Range    Sodium 141 135 - 145 mmol/L    Potassium 4.2 3.6 - 5.5 mmol/L    Chloride 112 96 - 112 mmol/L    Co2 24 20 - 33 mmol/L    Glucose 151 (H) 65 - 99 mg/dL    Bun 33 (H) 8 - 22 mg/dL    Creatinine 0.75 0.50 - 1.40 mg/dL    Calcium 8.5 8.5 - 10.5 mg/dL    Anion Gap 5.0 0.0 - 11.9   ESTIMATED GFR    Collection Time: 03/23/17 10:34 AM   Result Value Ref Range    GFR If African American >60 >60 mL/min/1.73 m 2    GFR If Non African American >60 >60 mL/min/1.73 m 2     Medical Decision Making, by Problem:  Active Hospital Problems    Diagnosis   • Cholecystitis, acute [K81.0]   • Leucocytosis [D72.829]   • Normocytic anemia [D64.9]   • Hypokalemia [E87.6]   • Chronic bronchitis (CMS-HCC) [J42]   • Memory loss [R41.3]     Plan:  Cholecystostomy tube clamped without increase in pain, leukocytosis or LFTs.   Advance diet as tolerated, will order low fat diet.   Ok to d/c to SNF from my standpoint.   F/u with me in 2 weeks after discharge.     Quality Measures:  Labs reviewed  Solis catheter: No Solis                    Discussed patient condition with Family and Patient

## 2017-03-23 NOTE — DISCHARGE PLANNING
Received notification via fax from [facility], referral has been accepted. OBIE Soto notified via voicemail.

## 2017-03-23 NOTE — PROGRESS NOTES
Monitor Summary    .14/.08/.34  SR 84-94  O and F PVCs, O bigem, O trigem, R couplets    Converted to A. Fib at 1529 up to 196  Converted to SR at 1642

## 2017-03-23 NOTE — DISCHARGE PLANNING
Transitional Care Navigator:    Spoke with patient's son today regarding choice for skilled rehab.  Son gave choice as [facility].  Choice form completed and faxed; SW aware.

## 2017-03-23 NOTE — PROGRESS NOTES
Report received from WALTER Bright. Assumed care of patient. Updated patient on plan of care. Fall precautions in place, call light within reach.

## 2017-03-23 NOTE — PROGRESS NOTES
12 Hr chart check.  Telemetry thru the noc .14/.08/.36 SR W/ PACs 77-98.  Has slept well after taking her pain meds, incontinent of urine, cleansed, linen changed.  Candace drain remained clamped thru noc w/ no Nausea /vomiting noted

## 2017-03-24 VITALS
DIASTOLIC BLOOD PRESSURE: 74 MMHG | HEIGHT: 59 IN | HEART RATE: 92 BPM | WEIGHT: 106.48 LBS | OXYGEN SATURATION: 92 % | BODY MASS INDEX: 21.47 KG/M2 | SYSTOLIC BLOOD PRESSURE: 144 MMHG | RESPIRATION RATE: 18 BRPM | TEMPERATURE: 99.2 F

## 2017-03-24 LAB
BACTERIA BLD CULT: NORMAL
BACTERIA BLD CULT: NORMAL
BNP SERPL-MCNC: 469 PG/ML (ref 0–100)
GLUCOSE BLD-MCNC: 118 MG/DL (ref 65–99)
GLUCOSE BLD-MCNC: 161 MG/DL (ref 65–99)
GLUCOSE BLD-MCNC: 175 MG/DL (ref 65–99)
LV EJECT FRACT  99904: 65
LV EJECT FRACT MOD 2C 99903: 68.1
LV EJECT FRACT MOD 4C 99902: 69.29
LV EJECT FRACT MOD BP 99901: 70.01
SIGNIFICANT IND 70042: NORMAL
SIGNIFICANT IND 70042: NORMAL
SITE SITE: NORMAL
SITE SITE: NORMAL
SOURCE SOURCE: NORMAL
SOURCE SOURCE: NORMAL

## 2017-03-24 PROCEDURE — 99239 HOSP IP/OBS DSCHRG MGMT >30: CPT | Performed by: INTERNAL MEDICINE

## 2017-03-24 PROCEDURE — 36415 COLL VENOUS BLD VENIPUNCTURE: CPT

## 2017-03-24 PROCEDURE — 700102 HCHG RX REV CODE 250 W/ 637 OVERRIDE(OP): Performed by: INTERNAL MEDICINE

## 2017-03-24 PROCEDURE — 93306 TTE W/DOPPLER COMPLETE: CPT | Mod: 26 | Performed by: INTERNAL MEDICINE

## 2017-03-24 PROCEDURE — 93306 TTE W/DOPPLER COMPLETE: CPT

## 2017-03-24 PROCEDURE — 770020 HCHG ROOM/CARE - TELE (206)

## 2017-03-24 PROCEDURE — 83880 ASSAY OF NATRIURETIC PEPTIDE: CPT

## 2017-03-24 PROCEDURE — 82962 GLUCOSE BLOOD TEST: CPT | Mod: 91

## 2017-03-24 PROCEDURE — A9270 NON-COVERED ITEM OR SERVICE: HCPCS | Performed by: INTERNAL MEDICINE

## 2017-03-24 RX ORDER — POTASSIUM CHLORIDE 750 MG/1
20 TABLET, EXTENDED RELEASE ORAL DAILY
Status: SHIPPED | DISCHARGE
Start: 2017-03-24

## 2017-03-24 RX ORDER — METRONIDAZOLE 500 MG/1
500 TABLET ORAL EVERY 8 HOURS
Qty: 30 TAB | Refills: 0 | Status: SHIPPED | DISCHARGE
Start: 2017-03-24 | End: 2017-03-31

## 2017-03-24 RX ORDER — CIPROFLOXACIN 500 MG/1
500 TABLET, FILM COATED ORAL EVERY 12 HOURS
Qty: 14 TAB | Refills: 0 | Status: SHIPPED | DISCHARGE
Start: 2017-03-24 | End: 2017-03-31

## 2017-03-24 RX ORDER — ONDANSETRON 4 MG/1
4 TABLET, ORALLY DISINTEGRATING ORAL EVERY 4 HOURS PRN
Qty: 10 TAB | Refills: 0 | Status: SHIPPED | DISCHARGE
Start: 2017-03-24

## 2017-03-24 RX ORDER — FUROSEMIDE 20 MG/1
20 TABLET ORAL 2 TIMES DAILY
Qty: 14 TAB | Refills: 0 | Status: SHIPPED | DISCHARGE
Start: 2017-03-24 | End: 2017-03-31

## 2017-03-24 RX ADMIN — METRONIDAZOLE 500 MG: 500 TABLET ORAL at 06:03

## 2017-03-24 RX ADMIN — CYANOCOBALAMIN TAB 500 MCG 1000 MCG: 500 TAB at 08:21

## 2017-03-24 RX ADMIN — CIPROFLOXACIN HYDROCHLORIDE 500 MG: 500 TABLET, FILM COATED ORAL at 08:21

## 2017-03-24 ASSESSMENT — ENCOUNTER SYMPTOMS
EYE PAIN: 0
DIZZINESS: 0
ABDOMINAL PAIN: 1
MYALGIAS: 0
DEPRESSION: 0
COUGH: 0
SEIZURES: 0
BLURRED VISION: 0
ABDOMINAL PAIN: 0
EYE REDNESS: 0
FEVER: 0
INSOMNIA: 0
FOCAL WEAKNESS: 0
CHILLS: 0
VOMITING: 0
BACK PAIN: 0
EYE DISCHARGE: 0
NECK PAIN: 0
HEADACHES: 0
ORTHOPNEA: 0
SHORTNESS OF BREATH: 0
NERVOUS/ANXIOUS: 0
PALPITATIONS: 0
NAUSEA: 0
WEAKNESS: 1
WEIGHT LOSS: 0
HEARTBURN: 0
SPUTUM PRODUCTION: 0

## 2017-03-24 ASSESSMENT — PAIN SCALES - GENERAL
PAINLEVEL_OUTOF10: 0
PAINLEVEL_OUTOF10: 0

## 2017-03-24 NOTE — DISCHARGE PLANNING
Oxana from [facility] received the okay from [facility]  [facility]. OBIE Grider notified via voicemail.

## 2017-03-24 NOTE — PROGRESS NOTES
Hospital Medicine Progress Note, Adult, Complex               Author: Obed BOUDREAUX Sarbjit Date & Time created: 3/24/2017  8:23 AM     CC: abdominal pain    Interval History:  83 y/o F with PMH of COPD on 2 L of home o2, memory loss(dementia?), DLD: admitted for above.    3/23: Complaint about abdominal pain. Discussed with the son Dwight(POA) about psych evaluation for capacity. He said she has been having memory issue over the past several years, but never been diagnosed with dementia.  Later this afternoon, patient was found to have afib with RVR. Gave diltiazem 10 injection 2 doses and able to bring rate<100. Started on digoxin and diltiazam po. Explained to the son Km in detail about Afib and AC, including risk and benefits. He will let us know about the decision later.    3/24: patient looks a little lethargic today. Discussed with the son again and he is still thinking about anticoagulation    Review of Systems:  Review of Systems   Constitutional: Positive for malaise/fatigue. Negative for fever, chills and weight loss.   HENT: Negative for congestion and nosebleeds.    Eyes: Negative for blurred vision, pain, discharge and redness.   Respiratory: Negative for cough, sputum production and shortness of breath.    Cardiovascular: Negative for chest pain, palpitations and orthopnea.   Gastrointestinal: Positive for abdominal pain. Negative for heartburn, nausea and vomiting.   Genitourinary: Negative for dysuria, urgency and frequency.   Musculoskeletal: Negative for myalgias, back pain and neck pain.   Skin: Negative for itching and rash.   Neurological: Positive for weakness. Negative for dizziness, focal weakness, seizures and headaches.   Psychiatric/Behavioral: Negative for depression. The patient is not nervous/anxious and does not have insomnia.        Physical Exam:  Physical Exam   Constitutional: She is oriented to person, place, and time. No distress.   HENT:   Head: Normocephalic and atraumatic.    Mouth/Throat: Oropharynx is clear and moist.   Eyes: Conjunctivae and EOM are normal. Pupils are equal, round, and reactive to light.   Neck: Normal range of motion. Neck supple. No thyromegaly present.   Cardiovascular:   No murmur heard.  IRIR   Pulmonary/Chest: Effort normal and breath sounds normal. No respiratory distress. She has no wheezes.   Abdominal: Soft. Bowel sounds are normal. She exhibits no distension. There is no tenderness. There is no rebound.   Musculoskeletal: She exhibits no edema or tenderness.   Neurological: She is alert and oriented to person, place, and time. No cranial nerve deficit.   Skin: Skin is warm and dry. She is not diaphoretic. No erythema.   Psychiatric: She has a normal mood and affect. Her behavior is normal. Thought content normal.       Labs:        Invalid input(s): [id number]  Recent Labs      17   0136   BNPBTYPENAT  469*     Recent Labs      17   0150  17   1557  17   0330  17   1034   SODIUM  139   --   139  141   POTASSIUM  3.6   --   3.7  4.2   CHLORIDE  112   --   112  112   CO2  19*   --   22  24   BUN  28*   --   35*  33*   CREATININE  0.63   --   0.72  0.75   MAGNESIUM   --   1.7   --    --    CALCIUM  8.0*   --   8.3*  8.5     Recent Labs      17   01517   0330  17   1034   ALTSGPT  9  9   --    ASTSGOT  13  10*   --    ALKPHOSPHAT  75  65   --    TBILIRUBIN  0.7  0.5   --    GLUCOSE  82  152*  151*     Recent Labs      17   0330   RBC  3.71*  3.54*   HEMOGLOBIN  11.0*  10.7*   HEMATOCRIT  35.9*  34.1*   PLATELETCT  253  239     Recent Labs      17   0330   WBC  9.1  8.9   NEUTSPOLYS  83.20*  81.70*   LYMPHOCYTES  6.20*  6.10*   MONOCYTES  7.10  10.40   EOSINOPHILS  0.00  0.90   BASOPHILS  0.00  0.00   ASTSGOT  13  10*   ALTSGPT  9  9   ALKPHOSPHAT  75  65   TBILIRUBIN  0.7  0.5           Hemodynamics:  Temp (24hrs), Av.9 °C (98.4 °F), Min:36.4 °C (97.6 °F),  Max:37.3 °C (99.1 °F)  Temperature: 36.7 °C (98 °F)  Pulse  Av.3  Min: 52  Max: 117   Blood Pressure : 149/65 mmHg     Respiratory:    Respiration: 17, Pulse Oximetry: 95 %     Work Of Breathing / Effort: Intercostal Retraction;Mild  RUL Breath Sounds: Clear, RML Breath Sounds: Diminished, RLL Breath Sounds: Diminished, JOSELYN Breath Sounds: Clear;Diminished, LLL Breath Sounds: Diminished  Fluids:  No intake or output data in the 24 hours ending 17 0823     GI/Nutrition:  Orders Placed This Encounter   Procedures   • DIET ORDER     Standing Status: Standing      Number of Occurrences: 1      Standing Expiration Date:      Order Specific Question:  Diet:     Answer:  Full Liquid [11]     Medical Decision Making, by Problem:    New onset Afib with RVR(paroxysmal)  - back to sinus after 2 doses of 10 mg diltiazem injection  - started on PO diltiazem and digoxin 250 mcg  - echo: pending  - will consult cardiology today    Acute cholecystitis: post cholecystotomy  - surgery following  - ultrasound cholelithiasis   - ERCP 3/20  1.  Periampullary diverticulum.  2.  Dilated biliary tree.  3.  Large stone in the distal common bile duct which migrated proximally into    the left intrahepatic duct.  4.  Successful sphincterotomy.  5.  Successful stone extraction using balloon.  - continue abx    Leucocytosis:  - improving  - on PO cipro and metronidazole    Memory loss: age related dementia?  - son is POA and wanted her to be in SNF upon discharge  - PT/OT  - per psych: she doesn't have capacity to make medical decision    Hypokalemia:  - improving  - replete as needed  - follow cmp in am    Normocytic anemia  - stable      Labs reviewed, Medications reviewed and Radiology images reviewed        DVT Prophylaxis: Heparin      Antibiotics: Treating active infection/contamination beyond 24 hours perioperative coverage

## 2017-03-24 NOTE — CONSULTS
DATE OF SERVICE:  03/23/2017    REQUESTING PHYSICIAN:  Dr. Schfafer.    INDICATION:  Atrial fibrillation.    HISTORY OF PRESENT ILLNESS:  The patient is a very pleasant 82-year-old woman.    Her family is at bedside and they are very inquisitive and concerned for her   care.  She herself is a vigorous woman with mild dementia and is planning on   going to a SNF.    She came to [facility] with abdominal complaints and was ultimately   transferred here to the [facility] hospital on the 19th of March.  She underwent   uncomplicated ERCP with percutaneous cholecystostomy for cholecystitis.  She   had no high risk features of sepsis but transiently went into atrial   fibrillation for several hours yesterday.  She is completely asymptomatic with   this.  She received 2 doses of diltiazem and today has been in normal sinus   rhythm.  The family had many concerns about this and for this reason, we are   consulted.  All other review of systems is negative.  She has not had   sustained fevers, hypotension, chest discomfort or palpitations.    PAST MEDICAL HISTORY:  1.  COPD, on long-term oxygen.  2.  Breast cancer status post mastectomy with chemotherapy.    CURRENT MEDICATIONS:  Normal saline at 75 mL an hour, Cipro 500 b.i.d.,   vitamin B12, digoxin 250 a day, Cardizem 60 mg every 6 hours, Lasix 20 mg   twice a day, insulin sliding scale, Flagyl 500 every 6-8 hours.    ALLERGIES:  None known.    SOCIAL HISTORY AND FAMILY HISTORY:  She is an ex-smoker.  She does not drink   alcohol or use illicit drugs.  She is not  any longer, but her   ex- is close with her.  Her son and daughter are at bedside.  She does   live independently.    FAMILY HISTORY:  Noncontributory for sudden cardiac death in the past.    PHYSICAL EXAMINATION:  VITAL SIGNS:  Blood pressure currently 120/53, heart rate is in the 70s.  She   has been in sinus rhythm all day.  GENERAL:  Elderly appearing woman who is mildly hard of hearing, joking with  "  the examiner.  NEUROLOGIC:  Cranial nerves are grossly intact bilaterally II-XII.  NECK:  Carotid upstrokes are brisk.  There are no audible bruits.  HEART:  Precordium is nontender without heave.  There are no audible murmurs   or diastolic sounds.  LUNGS:  Breath sounds are diminished, but without coarse crackles or rhonchi.  ABDOMEN:  Soft and nontender.  There were normal bowel sounds.  EXTREMITIES:  Warm and dry without pitting edema.    DATA:  A 12-lead ECG done yesterday and reviewed by me shows sinus rhythm and   the one antecedent to this earlier in the morning shows rapid atrial   fibrillation with no ischemic changes.  Hemoglobin is 10.7 with a normal MCV.    Creatinine is 0.8.  LFTs yesterday were normal with an albumin only of 2.3.    INR at admission was 1.5.  Cardiac echo done  and reviewed by me showed   normal left ventricular function with moderate pulmonary hypertension and the   right ventricle is not well seen.    ASSESSMENT AND RECOMMENDATIONS:  An 82-year-old woman with transient atrial   fibrillation in the setting of sepsis and cholecystitis.  She is doing quite   well hemodynamically and planning on going to a skilled nursing facility.  I   think that \"doing no harm\" is our best advantage right now, she is delicate   and not hypertensive and I would like to avoid over medicating her.  We talked   about the risks of stroke, if atrial fibrillation is persistent or recurrent   and they agree.  I think with her recent surgery I would elect the followin.  Watchful waiting.  I do not think we should send her out on 2 AV leobardo   blockers or anticoagulants.  2.  Aspirin is very reasonable if okay with her gastroenterologist.  3.  I will take the liberty of calling the office and making sure she gets in   with our team within the next month to talk, but certain we can focus more intensely   on long-term management of paroxysmal atrial fibrillation.    Thank you kindly.       " ____________________________________     MD YARIEL AMADOR    DD:  03/23/2017 17:25:57  DT:  03/23/2017 20:51:33    D#:  [id number]  Job#:  [id number]

## 2017-03-24 NOTE — CARE PLAN
Problem: Fluid Volume:  Goal: Will maintain balanced intake and output  Outcome: PROGRESSING AS EXPECTED  ENcourage patient to get out of bed to chair TID    Problem: Respiratory:  Goal: Respiratory status will improve  Outcome: PROGRESSING AS EXPECTED  ENcourage patient to use incentive spirometer

## 2017-03-24 NOTE — CARE PLAN
Problem: Safety  Goal: Will remain free from injury  Intervention: Provide assistance with mobility  Fall precautions in place. Bed in lowest position. Non-skid socks in place. Personal possessions within reach. Mobility sign on door. Bed-alarm on. Call light within reach. Pt educated regarding fall prevention and needs frequent reinforcement.       Problem: Skin Integrity  Goal: Risk for impaired skin integrity will decrease  Intervention: Assess risk factors for impaired skin integrity and/or pressure ulcers  Q. 2 hour turns preformed, barrier protectant used. Pt educated on the importance of repositioning to prevent skin breakdown. Needs reinforcement. Frequent monitoring of skin integrity.

## 2017-03-24 NOTE — DISCHARGE PLANNING
[facility] would like to know if pt will be transporting to their facility today. OBIE Marni notified via voicemail.

## 2017-03-24 NOTE — PROGRESS NOTES
Pt dc'd to [facility]. IV and monitor removed; monitor room notified. Pt left unit via wheelchair with medical transport. Personal belongings with pt when leaving unit. Pt given discharge instructions prior to leaving unit including prescription and when to visit with physician; verbalizes understanding. Copy of discharge instructions with pt and in the chart. WALTER Montes at [facility] given report.

## 2017-03-24 NOTE — DISCHARGE SUMMARY
Discharge summary    Admission date: 3/19/2017    Discharge date: 3/24/2016    Patient's Name: Nicole Vora  MRN: [id number]    Discharge diagnosis  New onset paroxysmal atrial fibrillation: resolved  Acute cholecystitis: post cholecystotomy  Memory loss(do not have capacity per psychiatry evaluation)    Consultant:   Dr. Polanco(surgery)  Dr. Quinn(cardiology  Dr. Vásquez(GI)    Condition on discharge: stable    Disposition: SNF    Diet: soft diet and advance slowly as tolerated    Hospital summary:  Please refer to H&P for details. In short 81 y/o F came to ER with abdominal pain and found to have acute cholecystitis. Surgery was consulted and did cholecystotomy. GI was consulted for ERCP and removed large stone from CBD and did sphincterotomy. Due to her severe cholecystitis, she was started on IV abx and transition to PO abx. Per surgery to continue abx for 7 days and follow up with them in 2 weeks for elective cholecystectomy, meanwhile to continue clamp the drain.   She also developed new onset atrial fibrillation, reversed back to sinus rhythm after IV diltiazem. She has been in sinus since then. Cardiology was consulted and do not recommend anticoagulation.  Things to follow at SNF  - to complete PO abx for 7 days  - to follow up with Dr. Polanco in 2 weeks for cholecystectomy  - to continue lasix 20 mg bid for a few more days and titrate as needed, as well as slowly titrate o2 off: her echo: normal, but BNP was a little elevated  - Son is a power of  for the patient(she doesn't have the capacity to make medical decision)         Medication List      START taking these medications       Instructions    ciprofloxacin 500 MG Tabs   Last time this was given:  500 mg on 3/24/2017  8:21 AM   Commonly known as:  CIPRO    Take 1 Tab by mouth every 12 hours for 7 days.   Dose:  500 mg       cyanocobalamin 1000 MCG Tabs   Last time this was given:  1,000 mcg on 3/24/2017  8:21 AM   Commonly known  as:  VITAMIN B12    Take 1 Tab by mouth every day.   Dose:  1000 mcg       furosemide 20 MG Tabs   Commonly known as:  LASIX    Take 1 Tab by mouth 2 times a day for 7 days.   Dose:  20 mg       metronidazole 500 MG Tabs   Last time this was given:  500 mg on 3/24/2017  6:03 AM   Commonly known as:  FLAGYL    Take 1 Tab by mouth every 8 hours for 7 days.   Dose:  500 mg       ondansetron 4 MG Tbdp   Commonly known as:  ZOFRAN ODT    Take 1 Tab by mouth every four hours as needed for Nausea/Vomiting (give PO if IV route is unavailable. May give per feeding tube.).   Dose:  4 mg       potassium chloride SA 10 MEQ Tbcr   Last time this was given:  40 mEq on 3/22/2017  3:46 PM   Commonly known as:  K-DUR    Take 2 Tabs by mouth every day.   Dose:  20 mEq             Lab Results   Component Value Date/Time    SODIUM 141 03/23/2017 10:34 AM    POTASSIUM 4.2 03/23/2017 10:34 AM    CHLORIDE 112 03/23/2017 10:34 AM    CO2 24 03/23/2017 10:34 AM    GLUCOSE 151* 03/23/2017 10:34 AM    BUN 33* 03/23/2017 10:34 AM    CREATININE 0.75 03/23/2017 10:34 AM        Lab Results   Component Value Date/Time    WBC 8.9 03/23/2017 03:30 AM    RBC 3.54* 03/23/2017 03:30 AM    HEMOGLOBIN 10.7* 03/23/2017 03:30 AM    HEMATOCRIT 34.1* 03/23/2017 03:30 AM    MCV 96.3 03/23/2017 03:30 AM    MCH 30.2 03/23/2017 03:30 AM    MCHC 31.4* 03/23/2017 03:30 AM    MPV 10.9 03/23/2017 03:30 AM    NEUTROPHILS-POLYS 81.70* 03/23/2017 03:30 AM    LYMPHOCYTES 6.10* 03/23/2017 03:30 AM    MONOCYTES 10.40 03/23/2017 03:30 AM    EOSINOPHILS 0.90 03/23/2017 03:30 AM    BASOPHILS 0.00 03/23/2017 03:30 AM    ANISOCYTOSIS 1+ 03/22/2017 01:50 AM        Procedure    - ERCP 3/20  1.  Periampullary diverticulum.  2.  Dilated biliary tree.  3.  Large stone in the distal common bile duct which migrated proximally into    the left intrahepatic duct.  4.  Successful sphincterotomy.  5.  Successful stone extraction using balloon    Imaging  Echo:     Normal left ventricular  size, wall thickness, and systolic function.  Left ventricular ejection fraction is visually estimated to be 65%.  Grade I diastolic dysfunction.  Prominent trabeculation noted in apex.  Normal right ventricular size and systolic function.  Aortic sclerosis without stenosis.  Mild aortic insufficiency.  Mild pulmonic insufficiency.  Normal pericardium without effusion.  INSTRUCTIONS:     The patient was instructed to return to the ER in the event of worsening symptoms. I have counseled the patient on the importance of compliance and the patient has agreed to proceed with all medical recommendations and follow up plan indicated above.   The patient understands that all medications come with benefits and risks. Risks may include permanent injury or death and these risks can be minimized with close reassessment and monitoring.        Follow up  Follow up with PCP in 1 week  Follow up with DR. Polanco in 2 weeks    Time spent on discharge day patient visit, preparing discharge paperwork and arranging for patient follow up 35 mins.

## 2017-03-24 NOTE — DISCHARGE INSTRUCTIONS
Discharge Instructions    Discharged to other by medical transportation with escort. Discharged via wheelchair, hospital escort: Yes.  Special equipment needed: Oxygen    Be sure to schedule a follow-up appointment with your primary care doctor or any specialists as instructed.     Discharge Plan:   Diet Plan: Discussed  Activity Level: Discussed  Confirmed Follow up Appointment: Patient to Call and Schedule Appointment  Confirmed Symptoms Management: Discussed  Medication Reconciliation Updated: Yes  Influenza Vaccine Indication: Patient Refuses    I understand that a diet low in cholesterol, fat, and sodium is recommended for good health. Unless I have been given specific instructions below for another diet, I accept this instruction as my diet prescription.   Other diet: heart healthy, low sodium     Special Instructions: None    · Is patient discharged on Warfarin / Coumadin?   No     · Is patient Post Blood Transfusion?  No    Depression / Suicide Risk    As you are discharged from this [facility] facility, it is important to learn how to keep safe from harming yourself.    Recognize the warning signs:  · Abrupt changes in personality, positive or negative- including increase in energy   · Giving away possessions  · Change in eating patterns- significant weight changes-  positive or negative  · Change in sleeping patterns- unable to sleep or sleeping all the time   · Unwillingness or inability to communicate  · Depression  · Unusual sadness, discouragement and loneliness  · Talk of wanting to die  · Neglect of personal appearance   · Rebelliousness- reckless behavior  · Withdrawal from people/activities they love  · Confusion- inability to concentrate     If you or a loved one observes any of these behaviors or has concerns about self-harm, here's what you can do:  · Talk about it- your feelings and reasons for harming yourself  · Remove any means that you might use to hurt yourself (examples: pills, rope,  extension cords, firearm)  · Get professional help from the community (Mental Health, Substance Abuse, psychological counseling)  · Do not be alone:Call your Safe Contact- someone whom you trust who will be there for you.  · Call your local CRISIS HOTLINE 908-2323 or 956-621-7558  · Call your local Children's Mobile Crisis Response Team [place] (991) 716-2178 or www.MediaMath  · Call the toll free National Suicide Prevention Hotlines   · National Suicide Prevention Lifeline 939-795-AXBE (5721)  · [employer] Line Network 800-SUICIDE (467-2621)    Cholecystitis  Cholecystitis is an inflammation of your gallbladder. It is usually caused by a buildup of gallstones or sludge (cholelithiasis) in your gallbladder. Your gallbladder stores a fluid that helps digest fats (bile). Cholecystitis is serious and needs treatment right away.  CAUSES   · Gallstones. Gallstones can block the tube that leads to your gallbladder, causing bile to build up. As bile builds up, your gallbladder becomes inflamed.  · Bile duct problems, such as blockage from scarring or kinking.  · Tumors. Tumors can stop bile from leaving your gallbladder correctly, causing bile to build up. As bile builds up, your gallbladder becomes inflamed.  SYMPTOMS  · Nausea.  · Vomiting.  · Abdominal pain, especially in the upper right area of your abdomen.  · Abdominal tenderness or bloating.  · Sweating.  · Chills.  · Fever.  · Yellowing of your skin and the whites of your eyes (jaundice).  DIAGNOSIS  Your health care provider may order blood tests to look for infection or gallbladder problems. Your health care provider may also order imaging tests, such as an ultrasound or MRI. If necessary, more imaging tests may be ordered. Further tests may include a hepatobiliary iminodiacetic acid (HIDA) scan. This scan allows your health care provider to see the bile move from your liver to your gallbladder and to your small intestine. Your health care  provider may perform liver function tests if gallstones are suspected.  TREATMENT  A hospital stay is usually necessary to lessen the inflammation of your gallbladder. You may be required to not eat or drink (fast) for a certain amount of time. You may be given medicine to treat pain or an antibiotic medicine to treat an infection. Surgery may be needed to remove your gallbladder (cholecystectomy) once the inflammation has gone down. Surgery may be needed right away if you develop complications, such as a tear (perforation) of the gallbladder or death of gallbladder tissue (gangrene).  HOME CARE INSTRUCTIONS  Home care will depend on your treatment. In general:  · If you were given antibiotics, take them as directed. Finish them even if you start to feel better.  · Only take over-the-counter or prescription medicines for pain, discomfort, or fever as directed by your health care provider.  · Follow a low-fat diet until you see your health care provider again.  · Keep all follow-up visits as directed by your health care provider.  · Avoid eating or drinking anything that triggers your symptoms.  SEEK IMMEDIATE MEDICAL CARE IF:  · Your pain is increasing and is not controlled by medicines.  · Your pain moves to another part of your abdomen or to your back.  · You have a fever.  · You have nausea and vomiting.  · You continue to have symptoms or develop new symptoms even with treatment.  MAKE SURE YOU:  · Understand these instructions.  · Will watch your condition.  · Will get help right away if you are not doing well or get worse.     This information is not intended to replace advice given to you by your health care provider. Make sure you discuss any questions you have with your health care provider.     Document Released: 12/18/2006 Document Revised: 01/08/2016 Document Reviewed: 03/30/2016  [employer] Interactive Patient Education ©2016 [employer]       Cholecystostomy  The gallbladder is a pear-shaped organ that  lies beneath the liver on the right side of the body. The gallbladder stores bile, a fluid that helps the body digest fats. However, sometimes bile and other fluids build up in the gallbladder because of an obstruction (for example, a gallstones). This can cause fever, pain, swelling, nausea and other serious symptoms.  The procedure used to drain these fluids is called a cholecystostomy. A tube is inserted into the gallbladder. Fluid drains through the tube into a plastic bag outside the body. This procedure is usually done on people who are admitted to the hospital.  The procedure is often recommended for people who cannot have gallbladder surgery right away, usually because they are too ill to make it through surgery. The cholecystostomy tube is usually temporary, until surgery can be done.  RISKS AND COMPLICATIONS  Although rare, complications can include:  · Clogging of the tube.  · Infection in or around the drain site. Antibiotics might be prescribed for the infection. Or, another tube might be inserted to drain the infected fluid.  · Internal bleeding from the liver.  BEFORE THE PROCEDURE   · Try to quit smoking several weeks before the procedure. Smoking can slow healing.  · Arrange for someone to drive you home from the hospital.  · Right before your procedure, avoid all foods and liquids after midnight. This includes coffee, tea and water.  · On the day of the procedure, arrive early to fill out all the paperwork.  PROCEDURE  You will be given a sedative to make you sleepy and a local anesthetic to numb the skin. Next, a small cut is made in the abdomen. Then a tube is threaded through the cut into the gallbladder. The procedure is usually done with ultrasound to guide the tube into the gallbladder. Once the tube is in place, the drain is secured to the skin with a stitch. The tube is then connected to a drainage bag.   AFTER THE PROCEDURE   · People who have a cholecystostomy usually stay in the hospital  for several days because they are so ill. You might not be able to eat for the first few days. Instead, you will be connected to an IV for fluids and nutrients.  · The procedure does not cure the blockage that caused the fluid to build up in the first place. Because of this, the gallbladder will need to be removed in the future. The drain is removed at that time.  HOME CARE INSTRUCTIONS   Be sure to follow your healthcare provider's instructions carefully. You may shower but avoid tub baths and swimming until your caregiver says it is OK. Eat and drink according to the directions you have been given. And be sure to make all follow-up appointments.   Call your healthcare provider if you notice new pain, redness or swelling around the wound.  SEEK IMMEDIATE MEDICAL CARE IF:   · There is increased abdominal pain.  · Nausea or vomiting occurs.  · You develop a fever.  · The drainage tube comes out of the abdomen.     This information is not intended to replace advice given to you by your health care provider. Make sure you discuss any questions you have with your health care provider.     Document Released: 03/16/2010 Document Revised: 01/08/2016 Document Reviewed: 03/30/2016  [employer] Interactive Patient Education ©2016 [employer]    Cholelithiasis  Cholelithiasis (also called gallstones) is a form of gallbladder disease. The gallbladder is a small organ that helps you digest fats. Symptoms of gallstones are:  · Feeling sick to your stomach (nausea).  · Throwing up (vomiting).  · Belly pain.  · Yellowing of the skin (jaundice).  · Sudden pain. You may feel the pain for minutes to hours.  · Fever.  · Pain to the touch.  HOME CARE  · Only take medicines as told by your doctor.  · Eat a low-fat diet until you see your doctor again. Eating fat can result in pain.  · Follow up with your doctor as told. Attacks usually happen time after time. Surgery is usually needed for permanent treatment.  GET HELP RIGHT AWAY IF:    · Your pain gets worse.  · Your pain is not helped by medicines.  · You have a fever and lasting symptoms for more than 2-3 days.  · You have a fever and your symptoms suddenly get worse.  · You keep feeling sick to your stomach and throwing up.  MAKE SURE YOU:   · Understand these instructions.  · Will watch your condition.  · Will get help right away if you are not doing well or get worse.     This information is not intended to replace advice given to you by your health care provider. Make sure you discuss any questions you have with your health care provider.     Document Released: 06/05/2009 Document Revised: 08/20/2014 Document Reviewed: 06/11/2014  [employer]vier Interactive Patient Education ©2016 [employer].

## 2017-03-24 NOTE — DISCHARGE PLANNING
Medical Social Work    Pt has been accepted to [facility] and is medically cleared to transfer today.  Transport arranged for 1400 via [facility] van.  SW met with pt and pt's son at bedside, both agreeable to transfer and signed cobra.  Cobra placed on pt's chart and bedside RN updated.     Plan: Pt to transfer to [facility] today at 1400 via [facility] van.

## 2017-03-24 NOTE — PROGRESS NOTES
Numerous calls received from monitor tech in reference to patient going in and out af Atrial FIB in the 120's to 130's and found in bed awake , denies pain, no shortness of breath no chest discomfort. Incontinent of urine partial linen changed and perineal care done. Patient following simple commands.

## 2017-03-24 NOTE — PROGRESS NOTES
Report received at bedside from Lucille WATSON; patient awake and alert with several family at bedside including , son and grandson; Son is very vocal and aggressive that patient should not get Lasix, and many other medications such as digoxin. Reassured son Dwight that his mother ( patient) will not receive lasix tonight.    left later after patient was asleep and reassured that patient will be monitored and provided excellent care. Patient resting in bed  Able to answer certain questions disoriented to place, and time. Otherwise very pleasant and cooperative and remains incontinent of urine.

## 2017-03-24 NOTE — PROGRESS NOTES
Surgical Progress Note    Author: Lucille Polanco Date & Time created: 3/24/2017   12:19 PM     Interval Events:  Improved mental status today, back to baseline this afternoon. No N/V, no abdominal pain, tolerating diet.    Review of Systems   Constitutional: Negative for fever and chills.   Gastrointestinal: Negative for nausea, vomiting and abdominal pain.     Hemodynamics:  Temp (24hrs), Av.9 °C (98.4 °F), Min:36.4 °C (97.6 °F), Max:37.3 °C (99.1 °F)  Temperature: 36.7 °C (98 °F)  Pulse  Av.3  Min: 52  Max: 117   Blood Pressure : 149/65 mmHg     Respiratory:    Respiration: 17, Pulse Oximetry: 95 %     Work Of Breathing / Effort: Mild  RUL Breath Sounds: Clear, RML Breath Sounds: Diminished, RLL Breath Sounds: Diminished, JOSELYN Breath Sounds: Clear, LLL Breath Sounds: Diminished  Neuro:  GCS       Fluids:  No intake or output data in the 24 hours ending 17 1219     Current Diet Order   Procedures   • DIET ORDER     Physical Exam   Constitutional: She is oriented to person, place, and time. She appears well-developed and well-nourished. No distress.   HENT:   Head: Normocephalic and atraumatic.   Eyes: Conjunctivae are normal.   Neck: Normal range of motion.   Cardiovascular: Normal rate.    Pulmonary/Chest: Effort normal.   Abdominal: Soft. She exhibits no distension. There is tenderness.   Minimal tenderness around drain site   Musculoskeletal: Normal range of motion.   Neurological: She is alert and oriented to person, place, and time.   Skin: Skin is warm and dry. She is not diaphoretic.   Psychiatric: She has a normal mood and affect. Her behavior is normal.     Labs:  Recent Results (from the past 24 hour(s))   ACCU-CHEK GLUCOSE    Collection Time: 17  1:15 PM   Result Value Ref Range    Glucose - Accu-Ck 116 (H) 65 - 99 mg/dL   ACCU-CHEK GLUCOSE    Collection Time: 17  5:45 PM   Result Value Ref Range    Glucose - Accu-Ck 163 (H) 65 - 99 mg/dL   ACCU-CHEK GLUCOSE    Collection  Time: 03/23/17 10:05 PM   Result Value Ref Range    Glucose - Accu-Ck 161 (H) 65 - 99 mg/dL   BTYPE NATRIURETIC PEPTIDE    Collection Time: 03/24/17  1:36 AM   Result Value Ref Range    B Natriuretic Peptide 469 (H) 0 - 100 pg/mL   ACCU-CHEK GLUCOSE    Collection Time: 03/24/17  5:56 AM   Result Value Ref Range    Glucose - Accu-Ck 118 (H) 65 - 99 mg/dL     Medical Decision Making, by Problem:  Active Hospital Problems    Diagnosis   • Paroxysmal a-fib (CMS-HCC) [I48.0]   • Cholecystitis, acute [K81.0]   • Leucocytosis [D72.829]   • Normocytic anemia [D64.9]   • Hypokalemia [E87.6]   • Chronic bronchitis (CMS-HCC) [J42]   • B12 deficiency [E53.8]   • Memory loss [R41.3]     Plan:  Keep drain in and clamped in case of recurrent episode of cholecystitis.   One more week of antibiotics.   F/u with me in 2 weeks, interval cholecystectomy in 6-8 weeks once the inflammation has improved.       Quality Measures:  Labs reviewed                      Discussed patient condition with Family, RN and Patient

## 2017-03-24 NOTE — PROGRESS NOTES
"MS: SR with PVC\"s, PAC's, trigeminy and in and out of AFIB  Rate 81 to 98 and A FIB up to 130's  .16/.08/.32  "

## 2017-03-25 ENCOUNTER — PATIENT OUTREACH (OUTPATIENT)
Dept: HEALTH INFORMATION MANAGEMENT | Facility: OTHER | Age: 82
End: 2017-03-25

## 2017-03-25 PROCEDURE — 770020 HCHG ROOM/CARE - TELE (206)

## 2017-03-26 PROCEDURE — 770020 HCHG ROOM/CARE - TELE (206)

## 2017-03-27 PROCEDURE — 770020 HCHG ROOM/CARE - TELE (206)

## 2017-03-28 PROCEDURE — 770020 HCHG ROOM/CARE - TELE (206)

## 2017-03-29 PROCEDURE — 770020 HCHG ROOM/CARE - TELE (206)

## 2017-03-29 NOTE — DOCUMENTATION QUERY
DOCUMENTATION QUERY     PROVIDERS: Please select “Cosign w/ note”to reply to query.     To better represent the severity of illness of your patient, please review the following information and exercise your independent professional judgment in responding to this query.      Sepsis is documented in the Progress Notes and 3/24 Consult.  Based upon the clinical findings, risk factors, and treatment, can you clarify if sepsis was present?     Please select which applies:     ·         Sepsis present on admission (specify causative organism if known and any associated organ dysfunction if known)  ·         Sepsis  developed after admission (specify causative organism if known and any associated organ dysfunction if known)  ·         Sepsis has been ruled out  ·         SIRS related to infection, without Sepsis  ·         SIRS that is unrelated to any infection   ·         Other explanation of clinical findings (please document)  ·         Unable to determine        The medical record reflects the following:   Clinical Findings 3/19 pulse 74, resp 16, wbc 25.4      3/20 White count 19,000 down from 25,000, platelet count 227.   Sodium 140, potassium 3.6, BUN and creatinine of 29 and 0.86, alkaline phosphatase 99     3/24 Blood pressure 120/53, heart rate is in the 70s   Treatment IV antibiotics, cholecystostomy, CBD stone removal   Risk Factors Acute cholecystitis, choledocolithiasis, atrial fibrillation, hypotension   Location within medical record Consult, PN      Thank you,   Stacey Monae, ALEJANDRO, CCSCristianaP  [email or web address]

## 2017-03-30 PROCEDURE — 770020 HCHG ROOM/CARE - TELE (206)

## 2017-03-31 PROCEDURE — 770020 HCHG ROOM/CARE - TELE (206)

## 2017-04-01 PROCEDURE — 770020 HCHG ROOM/CARE - TELE (206)

## 2017-04-02 PROCEDURE — 770020 HCHG ROOM/CARE - TELE (206)

## 2017-04-03 PROCEDURE — 770020 HCHG ROOM/CARE - TELE (206)

## 2017-04-04 PROCEDURE — 770020 HCHG ROOM/CARE - TELE (206)

## 2017-04-04 NOTE — DOCUMENTATION QUERY
DOCUMENTATION QUERY   PROVIDERS: Please select “Cosign w/ note”to reply to query.   To better represent the severity of illness of your patient, please review the following information and exercise your independent professional judgment in responding to this query.   Sepsis is documented in the Progress Notes and 3/24 Consult. Based upon the clinical findings, risk factors, and treatment, can you clarify if sepsis was present?   Please select which applies:   · Sepsis present on admission (specify causative organism if known and any associated organ dysfunction if known)  · Sepsis developed after admission (specify causative organism if known and any associated organ dysfunction if known)  · Sepsis has been ruled out  · SIRS related to infection, without Sepsis  · SIRS that is unrelated to any infection   · Other explanation of clinical findings (please document)  · Unable to determine  The medical record reflects the following:   Clinical Findings  3/19 pulse 74, resp 16, wbc 25.4   3/20 White count 19,000 down from 25,000, platelet count 227. Sodium 140, potassium 3.6, BUN and creatinine of 29 and 0.86, alkaline phosphatase 99   3/24 Blood pressure 120/53, heart rate is in the 70s    Treatment  IV antibiotics, cholecystostomy, CBD stone removal    Risk Factors  Acute cholecystitis, choledocolithiasis, atrial fibrillation, hypotension    Location within medical record  Consult, PN    Thank you,   Stacey Monae, ALEJANDRO, CCSCristianaP   [email or web address]

## 2017-04-05 PROCEDURE — 770020 HCHG ROOM/CARE - TELE (206)

## 2017-04-06 PROCEDURE — 770020 HCHG ROOM/CARE - TELE (206)

## 2017-04-07 PROCEDURE — 770020 HCHG ROOM/CARE - TELE (206)

## 2017-04-08 PROCEDURE — 770020 HCHG ROOM/CARE - TELE (206)

## 2017-04-09 PROCEDURE — 770020 HCHG ROOM/CARE - TELE (206)

## 2017-04-10 PROCEDURE — 770020 HCHG ROOM/CARE - TELE (206)

## 2017-04-10 NOTE — H&P
"CHIEF COMPLAINT:  Weakness and poor intake.    HISTORY OF PRESENT ILLNESS:  This is an 82-year-old female who lives alone.    She has a son who has power of  checked on her and found her slumped   in the chair.  Lately, he has noted increasing weakness and poor oral intake.    She is supposed to be on home oxygen at 2 liters per nasal cannula at all   times, but really she has not been wearing this or compliant with this lately.    He also notes that she has been having intermittent problems with mentation   for some years.  She recently started with a new primary care physician.  They   had labs done on Friday, which showed a low B12 level.  Patient has   apparently been feeling poorly for about the last week.  She has been having   pain off and on in the epigastric to right upper quadrant area.  She is very   vague with regards to her history, but does indicate an area just to the right   of midline under her ribs.  She will not quantify in terms of numbers.  She   says it is \"not that bad\".  It has been intermittent in nature.  Her appetite   has been poor.  She has been eating and drinking very poorly.  She has not had   any emesis.  She says her bowel movements have been normal in color or   amount.  She denies any diarrhea.  She denies any overt nausea.  She denies   any fever, but has had chills and sweats.  She has felt increasingly dizzy and   weak.  She has perhaps some dysuria, but she is not really sure.  She has   felt some shortness of breath, but no chest pain, no headache, and no cough.    She has never had prior similar symptoms, additional history as related in the   concerns verbalized by her son.    PAST MEDICAL HISTORY AND PAST SURGICAL HISTORY:  Significant for breast cancer   in the 1990s.  She had a right mastectomy and chemotherapy.  She also has a   history of COPD and she is supposed to be on 2 liters oxygen 24/7.  She is not   on any other medications.  Past surgical history is " otherwise, negative.    FAMILY MEDICAL HISTORY:  Positive for diabetes, but negative for heart disease   or cancers.    SOCIAL HISTORY:  She is .  She is still friends with her ex-   who checks on her.  She has one son who is her power of  who also   checks on her.  She, however, lives alone, which is concerning to the son.    The patient does not smoke or drink.    CODE STATUS:  She does not want to be resuscitated in the event of a cardiac   arrest, but mechanical ventilation for respiratory issues is okay.    REVIEW OF SYSTEMS:  A 12-point review of systems is covered and as noted   above, otherwise, negative per AMA and CMS criteria.    PHYSICAL EXAMINATION:  VITAL SIGNS:  She is afebrile with a temperature of 36.3, pulse 78,   respirations 20, and blood pressure 143/61.  She is saturating 100% on 2   liters.  GENERAL:  This is a frail elderly  female.  She is oriented and   reasonably appropriate, but is a somewhat reluctant and difficult historian   and very vague at times.  HEENT:  Her pupils are equal and reactive.  She has arcus senilis bilaterally   in her iris.  Her extraocular movements are intact.  Her sclerae are mildly   dry and there may be some very, very mild scleral icterus as well.  Her   oropharynx is not overtly dry, but there is decreased salivary pooling.  NECK:  Supple.  LUNGS:  Sounds are markedly diminished, hard to tell if it is from decreased   effort or from her COPD as she does seem to be splinting a little from her   abdominal pain, but overall, her breath sounds are rather poor.  There are no   wheezes, rhonchi, or rales, however.  HEART:  Regular rate and rhythm without any murmurs, rubs, or gallops.  ABDOMEN:  Somewhat distended with palpable bloaty enlargement of the right   upper quadrant area with marked tenderness.  There is no guarding or rebound.    She is just quite tender in this area.  Bowel sounds are overall diminished.    Lower quadrants  are less tender, but she does have some referred pain.  Her   abdominal wall musculature is overall _____.  EXTREMITIES:  Show no clubbing, cyanosis, or edema.  NEUROLOGICAL:  Despite generalized weakness she is neurologically intact.    LABORATORY STUDIES AND DIAGNOSTIC IMAGING:  Her white count is elevated at   25.4, H and H are normal, and platelet count is 306,000.  She has 88% polys,   elevated immature granulocytes, and no bands are noted.  CHEMISTRY PROFILE:    Her sodium is 138, potassium 4.3, chloride 96, bicarb 29, and anion gap is 13.    It was normal on the 14th, BUN 29, it was 16 on the 14th, and creatinine is   1.2, it was 0.74 on the 14th.  Transaminases are normal.  Her alkaline   phosphatase is 131, which was actually lower than on the 14th when it was 199.    Her bilirubin is 1.5, albumin is normal.  Lactate initially is 3.56 and   subsequent is 2.49.  Troponin less than 0.02.  INR is 1.46.  Urinalysis shows   100 of protein, trace ketones, negative nitrate, and negative leukocyte   esterase.  The labs on the 14th additionally show profoundly low B12 of 80,   folate is normal.  Vitamin D is a little low at 28.  TSH was normal.  CT of   the abdomen and pelvis shows markedly distended gallbladder with gallbladder   wall thickening and pericholecystic fluid and inflammation with apparent   gallstone in the gallbladder neck consistent with acute cholecystitis with   intra and extrahepatic biliary dilation, which appears due to   choledocholithiasis.  She is incidentally noted to have diverticulosis, but no   diverticulitis, atherosclerosis of her aorta, and bilateral renal cysts.  CT   of the head was also performed due to her weakness and altered mentation   showing cerebral atrophy, white matter lucencies consistent with small vessel   changes.  No acute changes.  Chest x-ray shows clear lungs and cardiomegaly   with what to me looks like, right-sided enlargement.    ASSESSMENT:  1.  Sepsis.  The  patient has moderate sepsis secondary to acute cholecystitis   secondary to gallbladder neck obstruction with stone.  She has subsequently   been seen by Dr. Lucille Polanco and is recommending transfer to [facility]   for cholecystotomy and closer observation with surgery.  Plan is the patient   is a bit more stable.  She has been started on cefepime and Flagyl.  Blood   cultures are pending at this time.  2.  Chronic obstructive pulmonary disease with chronic respiratory failure and   hypoxemia.  The patient is not wheezing presently, should remain on   respiratory therapy protocol.  3.  Profound B12 deficiency, I did discuss this with the patient's family at   the time of admission.  We will start her on intramuscular B12.  She can   possibly be transitioned to high-dose oral therapy in the future once her   level is normalized, but she needs to have this level continuously monitored.  4.  Questionable mild dementia, this could be due to the B12 deficiency and   how she will function in the home setting following discharge will depend on   her response to replacement of her B12.  5.  Sepsis.  The patient is maintaining pressures.  She is not tachycardic.    She is not having any evidence of respiratory failure.  She has some mild   renal insufficiency, which could be due to sepsis, but cannot rule out that   she is just a little bit dehydrated and clinically, she does look somewhat   dry.  We will hydrate her and monitor her laboratory studies.  6.  Mild coagulopathy.  This is likely secondary to poor nutrition and should   not preclude invasive procedures at this point.  We will put her on sequential   compression devices for deep venous thrombosis prophylaxis until she has had   her cholecystotomy performed.  Plan of care was discussed with the patient at   the time of admission and updated with the family by Dr. Polanco after   consultation.  They are aware of patient's impending transfer to [facility].       ____________________________________     MD GERONIMO PALUMBO / KIMBERLY    DD:  03/19/2017 19:06:23  DT:  03/19/2017 19:52:37    D#:  [id number]  Job#:  [id number]

## 2017-04-11 PROCEDURE — 770020 HCHG ROOM/CARE - TELE (206)

## 2017-04-12 PROCEDURE — 770020 HCHG ROOM/CARE - TELE (206)

## 2017-07-12 ENCOUNTER — TELEPHONE (OUTPATIENT)
Dept: PULMONOLOGY | Facility: HOSPICE | Age: 82
End: 2017-07-12

## 2017-07-12 NOTE — Clinical Note
July 12, 2017        Nicole Vora  [address] [place] [address]        Dear Nicole:      We are contacting you regarding the oxygen switch out between [facility] and [facility].  As you may already know, [facility] will be exclusive with [facility] for all DME (Durable Medical Equipment) needs and your service will be transferred over to your new DME company within the upcoming weeks.  In order for us to provide an updated order that is needed for your oxygen switch out, we will need to see you for an office visit as it has been over 3 years since we have seen you previously as [facility] ([facility]).  If you are being followed by another provider for your oxygen needs and plan to have them give you an updated prescription, then no contact is needed.  If you are unsure and would like us to continue care for your pulmonary needs, please contact our scheduling department to schedule an appointment with one of our physicians.  Our scheduling department is available Monday thru Friday 8 am to 6 pm and can be reached at [phone] and ask for Pulmonary scheduling when prompted.       Thank you,      [facility]

## 2017-07-12 NOTE — TELEPHONE ENCOUNTER
Unable to leave  as her MB has not been set up.  Mailing letter for pt to call and schedule appointment with pulmonary if she is wanting us to provide an updated prescription that's needed for O2 switchout. If she calls back to schedule an appt, she will be a new patient since we haven't seen her since Jan 2014 and the only testing needed would be to add multi-ox to appt note to re qualify her for O2.

## 2020-01-30 ENCOUNTER — PATIENT OUTREACH (OUTPATIENT)
Dept: SCHEDULING | Facility: IMAGING CENTER | Age: 85
End: 2020-01-30

## 2020-01-30 NOTE — PROGRESS NOTES
PCP Outreach- Outcome: Left Message    Please transfer to Patient Outreach Team at [phone] when patient returns call.      Attempt # 1

## 2021-01-13 DIAGNOSIS — Z23 NEED FOR VACCINATION: ICD-10-CM

## 2021-06-03 NOTE — IP AVS SNAPSHOT
3/24/2017          Nicole Vora  [address] [place] [address]    Dear Nicole:    On license of [facility] wants to ensure your discharge home is safe and you or your loved ones have had all your questions answered regarding your care after you leave the hospital.    You may receive a telephone call within two days of your discharge.  This call is to make certain you understand your discharge instructions as well as ensure we provided you with the best care possible during your stay with us.     The call will only last approximately 3-5 minutes and will be done by a nurse.    Once again, we want to ensure your discharge home is safe and that you have a clear understanding of any next steps in your care.  If you have any questions or concerns, please do not hesitate to contact us, we are here for you.  Thank you for choosing [facility] for your healthcare needs.    Sincerely,    Joe Guerrier    [facility]         Positive reinforcement

## 2023-09-18 NOTE — PROGRESS NOTES
Assumed care from [facility] transport after receiving report from Tammy WATSON at [facility]. Pt educated to unit safety and call light. Strip alarm in place. Pt on 02 at 2lpm, and N/S at 150mL/hr. Bed locked and in lowest position. Pt arrived with her son Michi. Pt A&Ox3, not able to determine month (time). No complaints of SOB, pain or discomfort. Pt has pleasant disposition.   Interval History: No acute events overnight, but had pain through the evening finally relieved by hydromorphone around 3AM. Tired since. Discussed plan of care with patient / daughters. No other concerns at this time.    Review of Systems   Constitutional:  Positive for fatigue. Negative for chills and fever.   Respiratory:  Negative for cough and shortness of breath.    Cardiovascular:  Negative for chest pain and palpitations.   Gastrointestinal:  Negative for abdominal pain, nausea and vomiting.     Objective:     Vital Signs (Most Recent):  Temp: 97.6 °F (36.4 °C) (09/18/23 1400)  Pulse: 60 (09/18/23 1400)  Resp: 16 (09/18/23 1030)  BP: (!) 152/76 (09/18/23 1400)  SpO2: (!) 60 % (09/18/23 1300) Vital Signs (24h Range):  Temp:  [96.3 °F (35.7 °C)-98.7 °F (37.1 °C)] 97.6 °F (36.4 °C)  Pulse:  [50-68] 60  Resp:  [16-18] 16  SpO2:  [54 %-100 %] 60 %  BP: (133-180)/(68-88) 152/76     Weight: 63.5 kg (140 lb)  Body mass index is 23.3 kg/m².    Intake/Output Summary (Last 24 hours) at 9/18/2023 1509  Last data filed at 9/18/2023 1400  Gross per 24 hour   Intake 740 ml   Output 3000 ml   Net -2260 ml         Physical Exam  Vitals and nursing note reviewed.   Constitutional:       General: She is not in acute distress.     Appearance: She is well-developed.   HENT:      Head: Normocephalic and atraumatic.   Eyes:      General:         Right eye: No discharge.         Left eye: No discharge.      Conjunctiva/sclera: Conjunctivae normal.   Cardiovascular:      Rate and Rhythm: Normal rate.      Comments: Diminished BLE pulses.  Pulmonary:      Effort: Pulmonary effort is normal. No respiratory distress.      Comments: Mildly diminished at bases.  Abdominal:      Palpations: Abdomen is soft.      Tenderness: There is no abdominal tenderness.   Musculoskeletal:         General: Normal range of motion.      Right lower leg: No edema.      Left lower leg: No edema.      Comments: RUE fistula with thrill.   Skin:     Comments:  LLE cool to touch and dry. L first toe with gangrenous changes with surrounding erythema, no noted purulence or drainage. L 2nd toe with absent nail.   Neurological:      Mental Status: She is oriented to person, place, and time and easily aroused. She is lethargic.           Significant Labs:   CBC:  Recent Labs   Lab 09/17/23  0916 09/18/23 0418   WBC 10.30 9.49   HGB 11.7* 10.1*   HCT 36.5* 31.6*    213   GRAN 79.1*  8.2* 75.6*  7.2   LYMPH 8.4*  0.9* 10.2*  1.0   MONO 9.8  1.0 11.6  1.1*   EOS 0.1 0.1   BASO 0.07 0.06   CMP:  Recent Labs   Lab 09/17/23  0916 09/18/23 0418    136   K 4.0 4.1   CL 97 95   CO2 24 21*   BUN 40* 43*   CREATININE 10.9* 11.3*   * 158*   CALCIUM 9.4 8.0*   MG  --  2.2   PHOS  --  8.1*   ALKPHOS 84  --    AST 20  --    ALT 8*  --    BILITOT 1.0  --    PROT 7.2  --    ALBUMIN 3.5 2.8*   ANIONGAP 17* 20*      Significant Imaging: I have reviewed and interpreted all pertinent imaging results/findings within the past 24 hours.

## 2023-11-16 NOTE — IP AVS SNAPSHOT
[employer] Access Code: Activation code not generated  Current [employer] Status: Patient Declined    Your email address is not on file at [facility].  Email Addresses are required for you to sign up for [employer], please contact [phone] to verify your personal information and to provide your email address prior to attempting to register for [employer].    Nicole iDony  [address] [place] [address]    [employer]  A secure, online tool to manage your health information     [facility]’s [employer]® is a secure, online tool that connects you to your personalized health information from the privacy of your home -- day or night - making it very easy for you to manage your healthcare. Once the activation process is completed, you can even access your medical information using the [employer] lenore, which is available for free in the Apple Lenore store or Google Play store.     To learn more about [employer], visit [email or web address]    There are two levels of access available (as shown below):   My Chart Features  [facility] Primary Care Doctor [facility]  Specialists [facility]  Urgent  Care Non-[facility] Primary Care Doctor   Email your healthcare team securely and privately 24/7 X X X    Manage appointments: schedule your next appointment; view details of past/upcoming appointments X      Request prescription refills. X      View recent personal medical records, including lab and immunizations X X X X   View health record, including health history, allergies, medications X X X X   Read reports about your outpatient visits, procedures, consult and ER notes X X X X   See your discharge summary, which is a recap of your hospital and/or ER visit that includes your diagnosis, lab results, and care plan X X  X     How to register for [employer]:  Once your e-mail address has been verified, follow the following steps to sign up for [employer].     1. Go to  [email or web address]  2. Click on the Sign Up Now box, which takes you to the New  Member Sign Up page. You will need to provide the following information:  a. Enter your [employer] Access Code exactly as it appears at the top of this page. (You will not need to use this code after you’ve completed the sign-up process. If you do not sign up before the expiration date, you must request a new code.)   b. Enter your date of birth.   c. Enter your home email address.   d. Click Submit, and follow the next screen’s instructions.  3. Create a [employer] ID. This will be your [employer] login ID and cannot be changed, so think of one that is secure and easy to remember.  4. Create a [employer] password. You can change your password at any time.  5. Enter your Password Reset Question and Answer. This can be used at a later time if you forget your password.   6. Enter your e-mail address. This allows you to receive e-mail notifications when new information is available in [employer].  7. Click Sign Up. You can now view your health information.    For assistance activating your [employer] account, call [phone]          weakness

## (undated) DEVICE — DRAPE C-ARM LARGE 41IN X 74 IN - (10/BX 2BX/CA)

## (undated) DEVICE — KIT ROOM DECONTAMINATION

## (undated) DEVICE — HEAD HOLDER JUNIOR/ADULT

## (undated) DEVICE — SET EXTENSION WITH 2 PORTS (48EA/CA) ***PART #2C8610 IS A SUBSTITUTE*****

## (undated) DEVICE — TUBE E-T HI-LO CUFF 6.5MM (10EA/BX)

## (undated) DEVICE — SENSOR SPO2 NEO LNCS ADHESIVE (20/BX) SEE USER NOTES

## (undated) DEVICE — TRUETOME JAG 44 PRELOADED SPHINTERTOME

## (undated) DEVICE — KIT ANESTHESIA W/CIRCUIT & 3/LT BAG W/FILTER (20EA/CA)

## (undated) DEVICE — ELECTRODE DUAL RETURN W/ CORD - (50/PK)

## (undated) DEVICE — EXTRACTOR PRO XL 12-15 MM ABOVE

## (undated) DEVICE — ELECTRODE 850 FOAM ADHESIVE - HYDROGEL RADIOTRNSPRNT (50/PK)

## (undated) DEVICE — MASK ANESTHESIA ADULT  - (100/CA)

## (undated) DEVICE — LACTATED RINGERS INJ 1000 ML - (14EA/CA 60CA/PF)

## (undated) DEVICE — GOWN WARMING STANDARD FLEX - (30/CA)

## (undated) DEVICE — PROTECTOR ULNA NERVE - (36PR/CA)

## (undated) DEVICE — SLEEVE, VASO, THIGH, MED

## (undated) DEVICE — LEAD SET 6 DISP. EKG NIHON KOHDEN

## (undated) DEVICE — CANISTER SUCTION 3000ML MECHANICAL FILTER AUTO SHUTOFF MEDI-VAC NONSTERILE LF DISP  (40EA/CA)

## (undated) DEVICE — SET LEADWIRE 5 LEAD BEDSIDE DISPOSABLE ECG (1SET OF 5/EA)

## (undated) DEVICE — BITE BLOCK ADULT 60FR (100EA/CA)

## (undated) DEVICE — SUCTION INSTRUMENT YANKAUER BULBOUS TIP W/O VENT (50EA/CA)

## (undated) DEVICE — NEPTUNE 4 PORT MANIFOLD - (20/PK)

## (undated) DEVICE — SYRINGE DISP. 12 CC LL - (100/BX)

## (undated) DEVICE — FILM CASSETTE ENDO

## (undated) DEVICE — TUBING CLEARLINK DUO-VENT - C-FLO (48EA/CA)

## (undated) DEVICE — KIT CUSTOM PROCEDURE SINGLE FOR ENDO  (15/CA)